# Patient Record
Sex: MALE | Race: WHITE | NOT HISPANIC OR LATINO | Employment: FULL TIME | URBAN - NONMETROPOLITAN AREA
[De-identification: names, ages, dates, MRNs, and addresses within clinical notes are randomized per-mention and may not be internally consistent; named-entity substitution may affect disease eponyms.]

---

## 2018-08-24 ENCOUNTER — HOSPITAL ENCOUNTER (OUTPATIENT)
Dept: GENERAL RADIOLOGY | Facility: HOSPITAL | Age: 28
Discharge: HOME | End: 2018-08-24
Admitting: NURSE PRACTITIONER

## 2018-08-24 ENCOUNTER — TRANSCRIBE ORDERS (OUTPATIENT)
Dept: ADMINISTRATIVE | Facility: HOSPITAL | Age: 28
End: 2018-08-24

## 2018-08-24 DIAGNOSIS — R10.32 LEFT LOWER QUADRANT PAIN: ICD-10-CM

## 2018-08-24 DIAGNOSIS — R10.32 LEFT LOWER QUADRANT PAIN: Primary | ICD-10-CM

## 2018-08-24 PROCEDURE — 74022 RADEX COMPL AQT ABD SERIES: CPT

## 2020-05-18 ENCOUNTER — OFFICE VISIT (OUTPATIENT)
Dept: INTERNAL MEDICINE | Facility: CLINIC | Age: 30
End: 2020-05-18

## 2020-05-18 VITALS
BODY MASS INDEX: 25.27 KG/M2 | DIASTOLIC BLOOD PRESSURE: 70 MMHG | TEMPERATURE: 98.8 F | WEIGHT: 203.2 LBS | HEIGHT: 75 IN | SYSTOLIC BLOOD PRESSURE: 110 MMHG | HEART RATE: 65 BPM | OXYGEN SATURATION: 99 %

## 2020-05-18 DIAGNOSIS — M25.561 ACUTE PAIN OF RIGHT KNEE: Primary | ICD-10-CM

## 2020-05-18 PROCEDURE — 99213 OFFICE O/P EST LOW 20 MIN: CPT | Performed by: NURSE PRACTITIONER

## 2020-05-18 NOTE — PROGRESS NOTES
"Subjective   Navjot Au is a 29 y.o. male.   Chief Complaint   Patient presents with   • Knee Pain     right knee cap \"crunches and unable to bear weight. Popped this morning       Navjot presents with complaints of right knee pain that started about 2-3 weeks ago and has progressively worsened.  He denies any recent injury but does report in January 2020 he slipped on a dock and hit his right knee on the corner of a rail support.  He iced the knee and rested for a few days and this resolved.  He has recently changed jobs about 1 month ago and now working at [employer] where he stands and walks on concrete rose and performs heavy lifting throughout the day.  Knee pain and swelling worsens while at work.  He has been icing the knee at home and has tried Ibuprofen once for pain.  He reports when he got up this morning there was a loud \"pop\" in his knee.  It is difficult for him to bear weight on the knee.         The following portions of the patient's history were reviewed and updated as appropriate: allergies, current medications, past family history, past medical history, past social history, past surgical history and problem list.    Review of Systems   Constitutional: Negative for activity change, appetite change, fatigue, fever, unexpected weight gain and unexpected weight loss.   HENT: Negative for swollen glands, trouble swallowing and voice change.    Eyes: Negative for blurred vision and visual disturbance.   Respiratory: Negative for cough and shortness of breath.    Cardiovascular: Negative for chest pain, palpitations and leg swelling.   Gastrointestinal: Negative for abdominal pain, constipation, diarrhea, nausea, vomiting and indigestion.   Endocrine: Negative for cold intolerance, heat intolerance, polydipsia and polyphagia.   Genitourinary: Negative for dysuria and frequency.   Musculoskeletal: Negative for arthralgias, back pain, joint swelling and neck pain.        Right knee pain   Skin: " Negative for color change, rash and skin lesions.   Neurological: Negative for dizziness, weakness, headache, memory problem and confusion.   Hematological: Does not bruise/bleed easily.   Psychiatric/Behavioral: Negative for agitation, hallucinations and suicidal ideas. The patient is not nervous/anxious.        Objective   History reviewed. No pertinent past medical history.   History reviewed. No pertinent surgical history.   No current outpatient medications on file.     Vitals:    05/18/20 0830   BP: 110/70   Pulse: 65   Temp: 98.8 °F (37.1 °C)   SpO2: 99%         05/18/20  0830   Weight: 92.2 kg (203 lb 3.2 oz)     Patient's Body mass index is 25.4 kg/m². BMI is within normal parameters. No follow-up required..      Physical Exam   Constitutional: He is oriented to person, place, and time. He appears well-developed and well-nourished.   HENT:   Head: Normocephalic and atraumatic.   Right Ear: External ear normal.   Left Ear: External ear normal.   Nose: Nose normal.   Cardiovascular: Normal rate, regular rhythm, normal heart sounds and intact distal pulses.   Pulmonary/Chest: Effort normal and breath sounds normal.   Musculoskeletal:        Right knee: He exhibits decreased range of motion and swelling (Slightly larger than left kne). He exhibits no ecchymosis and no erythema.   The right knee is tender to touch on the front lower patella, there is worsening pain with palpation to the posterior knee down to the calf.  There is no significant effusion to the posterior knee.  No edema to the calf.  Limited range of motion.  He is unable to fully extend the knee.  Flexes without difficulty.  While standing he demonstrates pain with an initial limp, then able to walk without limping.    Neurological: He is alert and oriented to person, place, and time.   Skin: Skin is warm and dry.   Psychiatric: He has a normal mood and affect. His behavior is normal. Thought content normal.   Nursing note and vitals  reviewed.            Assessment/Plan   Diagnoses and all orders for this visit:    1. Acute pain of right knee (Primary)  -     Cancel: XR Knee 1 or 2 View Right; Future  -     XR Knee 1 or 2 View Right; Future        Will obtained x-rays today.  I have informed patient that additional imaging may be needed and possible referral to ortho.  He understands and plans to get x-rays this morning following appointment.   I have informed him that he can follow up with Dr. Holliday for possible knee injection if x-rays show arthritic changes.  He declines stating he is not a fan of needles.   Navjot is overdue for yearly exam and labwork.  I have requesting he scheduled for this within the next 3 months.

## 2020-05-19 ENCOUNTER — TELEPHONE (OUTPATIENT)
Dept: INTERNAL MEDICINE | Facility: CLINIC | Age: 30
End: 2020-05-19

## 2020-05-19 DIAGNOSIS — M25.561 ACUTE PAIN OF RIGHT KNEE: Primary | ICD-10-CM

## 2020-05-19 NOTE — PROGRESS NOTES
X-ray of the knee is normal.  Will refer to Ortho.  I have placed this order in chart.  At this time, I advise rest, elevation, and Ice after working.  He may try a knee brace for extra support.

## 2020-05-19 NOTE — TELEPHONE ENCOUNTER
Patient called in requesting to go over his imaging with Soniya. Advised she would call when she has reviewed the images.

## 2020-08-24 ENCOUNTER — TELEPHONE (OUTPATIENT)
Dept: INTERNAL MEDICINE | Facility: CLINIC | Age: 30
End: 2020-08-24

## 2020-08-24 NOTE — TELEPHONE ENCOUNTER
Caller: Navjot Au    Relationship: Self    Best call back number: [phone]    What form or medical record are you requesting: LETTER STATING THAT PATIENT IS STABLE WITHOUT TAKING HIS ADD MEDICATION.     Who is requesting this form or medical record from you: [employer]    How would you like to receive the form or medical records (pick-up, mail, fax): FAX  If fax, what is the fax number: --  If mail, what is the address:   If pick-up, provide patient with address and location details    Timeframe paperwork needed: ASAP    Additional notes: PATIENT DIDN'T GET THE FAX NUMBER FOR [employer] BUT WOULD BE OKAY WITH PICKING UP THE LETTER IF NEED BE. PATIENT WILL NEED TO BE NOTIFIED.

## 2020-08-24 NOTE — TELEPHONE ENCOUNTER
I've seen him once for knee pain.  ADD medication is not listed in the chart.  Can someone get me some more information please?

## 2020-08-25 ENCOUNTER — OFFICE VISIT (OUTPATIENT)
Dept: INTERNAL MEDICINE | Facility: CLINIC | Age: 30
End: 2020-08-25

## 2020-08-25 VITALS
BODY MASS INDEX: 26.01 KG/M2 | SYSTOLIC BLOOD PRESSURE: 130 MMHG | HEART RATE: 78 BPM | TEMPERATURE: 98.2 F | DIASTOLIC BLOOD PRESSURE: 80 MMHG | WEIGHT: 209.2 LBS | OXYGEN SATURATION: 98 % | HEIGHT: 75 IN

## 2020-08-25 DIAGNOSIS — F98.8 ATTENTION DEFICIT DISORDER (ADD) WITHOUT HYPERACTIVITY: Primary | ICD-10-CM

## 2020-08-25 PROCEDURE — 99213 OFFICE O/P EST LOW 20 MIN: CPT | Performed by: NURSE PRACTITIONER

## 2020-08-25 NOTE — PROGRESS NOTES
Subjective   Navjot Au is a 30 y.o. male.   Chief Complaint   Patient presents with   • Office visit     discuss diagnosis of ADD       Navjot presents today to obtain a letter stating he can perform job duties without the use of ADD medication.  Navjot reports that he used to take Ritalin in elementary school and Concerta through the first few years of high school for ADD.  He states learning was difficult for him and the medication helped him through school.  He states he has not taken this medication in several years, which he believes has been since high school.  He states he does not have trouble with work related activities.  He has been working on getting employment with [employer] and during a prehealth screening he mentioned he was no longer on this medication, and he was told he needed a letter from his PCP stating he could perform job duties without the use of ADD medication.      He denies trouble with anxiety or mood.  He denies anger management issues.          The following portions of the patient's history were reviewed and updated as appropriate: allergies, current medications, past family history, past medical history, past social history, past surgical history and problem list.    Review of Systems   Constitutional: Negative for activity change, appetite change, fatigue, fever, unexpected weight gain and unexpected weight loss.   HENT: Negative for swollen glands, trouble swallowing and voice change.    Eyes: Negative for blurred vision and visual disturbance.   Respiratory: Negative for cough and shortness of breath.    Cardiovascular: Negative for chest pain, palpitations and leg swelling.   Gastrointestinal: Negative for abdominal pain, constipation, diarrhea, nausea, vomiting and indigestion.   Endocrine: Negative for cold intolerance, heat intolerance, polydipsia and polyphagia.   Genitourinary: Negative for dysuria and frequency.   Musculoskeletal: Negative for arthralgias, back  pain, joint swelling and neck pain.   Skin: Negative for color change, rash and skin lesions.   Neurological: Negative for dizziness, weakness, headache, memory problem and confusion.   Hematological: Does not bruise/bleed easily.   Psychiatric/Behavioral: Negative for agitation, hallucinations and suicidal ideas. The patient is not nervous/anxious.        Objective   History reviewed. No pertinent past medical history.   History reviewed. No pertinent surgical history.   No current outpatient medications on file.     Vitals:    08/25/20 1307   BP: 130/80   Pulse: 78   Temp: 98.2 °F (36.8 °C)   SpO2: 98%         08/25/20  1307   Weight: 94.9 kg (209 lb 3.2 oz)           Physical Exam   Constitutional: He is oriented to person, place, and time. He appears well-developed and well-nourished.   HENT:   Head: Normocephalic and atraumatic.   Right Ear: Tympanic membrane and external ear normal.   Left Ear: Tympanic membrane and external ear normal.   Nose: Nose normal.   Mouth/Throat: Oropharynx is clear and moist. No oral lesions.   Eyes: EOM are normal.   Neck: Normal range of motion.   Cardiovascular: Normal rate, regular rhythm and normal heart sounds.   Pulmonary/Chest: Effort normal and breath sounds normal.   Neurological: He is alert and oriented to person, place, and time.   Skin: Skin is warm and dry.   Psychiatric: He has a normal mood and affect. His speech is normal and behavior is normal. Thought content normal.   Nursing note and vitals reviewed.            Assessment/Plan   Diagnoses and all orders for this visit:    1. Attention deficit disorder (ADD) without hyperactivity (Primary)      I have reviewed past documentation in [facility] which does refills as recent as 5/2019.  Patient reports he never took this medication he returned it to the pharmacy.  A [facility] report also shows a fill in 5/2019 from a different provider.  A [facility] report was completed today does not show any fills in the past 1 year.   None the less, It appears Navjot has not been taking this medication and reports he is doing well.  He states the medication is more specific to learning issues, as opposed to physical labor.  I do not have concerns with him performing his job as a  in regards to ADD.  I have advised that he return to the office to discuss further if he feels he is having difficulty understanding or completed his job tasks.    Letter has been provided.

## 2021-06-07 ENCOUNTER — TELEPHONE (OUTPATIENT)
Dept: INTERNAL MEDICINE | Facility: CLINIC | Age: 31
End: 2021-06-07

## 2021-06-07 NOTE — TELEPHONE ENCOUNTER
PATIENT CALLED AND STATED THAT HE HAS A JOB APPLICATION AND HEALTH CERTIFICATE THAT NEEDS TO BE SIGNED BY THE END OF THIS WEEK. PLEASE ADVISE IF PROVIDER CAN SIGN THIS DOCUMENT.    CALLBACK: [phone]

## 2021-06-07 NOTE — TELEPHONE ENCOUNTER
Called patient, advised him we have never seen him in the office. Pt states he saw  10-15 yrs ago. I advised him he would need to be seen as new patient in order to fill out or sign anything. Also advised him no guarantee we would be able to sign off depending on what kind of form it is and what it is requesting as we do not have any history on file. He voiced understanding. States his PCP is  and can't see him until later this month.

## 2021-06-09 LAB
ALBUMIN SERPL-MCNC: 4.7 G/DL (ref 3.5–5.2)
ALP BLD-CCNC: 96 U/L (ref 40–130)
ALT SERPL-CCNC: 29 U/L (ref 5–41)
ANION GAP SERPL CALCULATED.3IONS-SCNC: 9 MMOL/L (ref 7–19)
AST SERPL-CCNC: 17 U/L (ref 5–40)
BASOPHILS ABSOLUTE: 0 K/UL (ref 0–0.2)
BASOPHILS RELATIVE PERCENT: 0.5 % (ref 0–1)
BILIRUB SERPL-MCNC: 0.4 MG/DL (ref 0.2–1.2)
BUN BLDV-MCNC: 13 MG/DL (ref 6–20)
CALCIUM SERPL-MCNC: 9.6 MG/DL (ref 8.6–10)
CHLORIDE BLD-SCNC: 105 MMOL/L (ref 98–111)
CO2: 28 MMOL/L (ref 22–29)
CREAT SERPL-MCNC: 0.9 MG/DL (ref 0.5–1.2)
EOSINOPHILS ABSOLUTE: 0.1 K/UL (ref 0–0.6)
EOSINOPHILS RELATIVE PERCENT: 1.8 % (ref 0–5)
GFR AFRICAN AMERICAN: >59
GFR NON-AFRICAN AMERICAN: >60
GLUCOSE BLD-MCNC: 88 MG/DL (ref 74–109)
HCT VFR BLD CALC: 48.2 % (ref 42–52)
HEMOGLOBIN: 16.5 G/DL (ref 14–18)
IMMATURE GRANULOCYTES #: 0 K/UL
LYMPHOCYTES ABSOLUTE: 1.7 K/UL (ref 1.1–4.5)
LYMPHOCYTES RELATIVE PERCENT: 26.7 % (ref 20–40)
MCH RBC QN AUTO: 29.7 PG (ref 27–31)
MCHC RBC AUTO-ENTMCNC: 34.2 G/DL (ref 33–37)
MCV RBC AUTO: 86.7 FL (ref 80–94)
MONOCYTES ABSOLUTE: 0.5 K/UL (ref 0–0.9)
MONOCYTES RELATIVE PERCENT: 8 % (ref 0–10)
NEUTROPHILS ABSOLUTE: 4.1 K/UL (ref 1.5–7.5)
NEUTROPHILS RELATIVE PERCENT: 62.7 % (ref 50–65)
PDW BLD-RTO: 13.1 % (ref 11.5–14.5)
PLATELET # BLD: 205 K/UL (ref 130–400)
PMV BLD AUTO: 9.8 FL (ref 9.4–12.4)
POTASSIUM SERPL-SCNC: 4.3 MMOL/L (ref 3.5–5)
RBC # BLD: 5.56 M/UL (ref 4.7–6.1)
SODIUM BLD-SCNC: 142 MMOL/L (ref 136–145)
TOTAL PROTEIN: 8.2 G/DL (ref 6.6–8.7)
WBC # BLD: 6.5 K/UL (ref 4.8–10.8)

## 2021-07-27 ENCOUNTER — TELEPHONE (OUTPATIENT)
Dept: URGENT CARE | Facility: CLINIC | Age: 31
End: 2021-07-27

## 2021-07-27 PROCEDURE — U0004 COV-19 TEST NON-CDC HGH THRU: HCPCS | Performed by: NURSE PRACTITIONER

## 2021-08-06 PROCEDURE — U0004 COV-19 TEST NON-CDC HGH THRU: HCPCS | Performed by: FAMILY MEDICINE

## 2022-04-18 ENCOUNTER — OFFICE VISIT (OUTPATIENT)
Dept: INTERNAL MEDICINE | Facility: CLINIC | Age: 32
End: 2022-04-18

## 2022-04-18 VITALS
SYSTOLIC BLOOD PRESSURE: 112 MMHG | TEMPERATURE: 97.1 F | BODY MASS INDEX: 25.39 KG/M2 | HEIGHT: 77 IN | DIASTOLIC BLOOD PRESSURE: 88 MMHG | HEART RATE: 71 BPM | OXYGEN SATURATION: 99 % | WEIGHT: 215 LBS

## 2022-04-18 DIAGNOSIS — Z00.00 ROUTINE GENERAL MEDICAL EXAMINATION AT A HEALTH CARE FACILITY: Primary | ICD-10-CM

## 2022-04-18 DIAGNOSIS — R68.82 LOW LIBIDO: ICD-10-CM

## 2022-04-18 DIAGNOSIS — Z11.59 NEED FOR HEPATITIS C SCREENING TEST: ICD-10-CM

## 2022-04-18 DIAGNOSIS — R53.83 FATIGUE, UNSPECIFIED TYPE: ICD-10-CM

## 2022-04-18 PROCEDURE — 99395 PREV VISIT EST AGE 18-39: CPT

## 2022-04-18 NOTE — PROGRESS NOTES
"        Subjective     Chief Complaint   Patient presents with   • Annual Exam     Requesting testosterone checked along w/other labs       History of Present Illness  Patient states he is here for annual physical. Denies any complaints today. Goes to see a phsychologist once a month to \"improve himself.\" Does not really struggle with depression, but does have a little anxiety. States he was taking with his psychologist and was discussing his energy level, and decrease sex drive. Would like his testosterone checked. Denies any chest pain or shortness of breath. States he does have \"long Covid.\"   [place] [employer] for a living, and flies planes on the side. Is  with a one year old child.   Patient's PMR from outside medical facility reviewed and noted.    Review of Systems   Constitutional: Positive for fatigue. Negative for activity change, chills and unexpected weight change.   HENT: Negative for congestion, ear pain, mouth sores, rhinorrhea, sore throat and trouble swallowing.    Eyes: Negative for discharge and visual disturbance.   Respiratory: Negative for cough, shortness of breath and wheezing.    Cardiovascular: Negative for chest pain and leg swelling.   Gastrointestinal: Negative for abdominal pain, constipation, diarrhea, nausea and vomiting.   Genitourinary: Negative for decreased urine volume, difficulty urinating and hematuria.   Musculoskeletal: Negative for back pain and gait problem.   Skin: Negative for color change and rash.   Allergic/Immunologic: Negative for environmental allergies and immunocompromised state.   Neurological: Negative for weakness and headaches.   Psychiatric/Behavioral: Negative for confusion, dysphoric mood and sleep disturbance. The patient is nervous/anxious.         Otherwise complete ROS reviewed and negative except as mentioned in the HPI.    Past Medical History: History reviewed. No pertinent past medical history.  Past Surgical History:History reviewed. No " pertinent surgical history.  Social History:  reports that he has quit smoking. His smoking use included cigars. His smokeless tobacco use includes snuff. He reports current alcohol use. He reports that he does not use drugs.    Family History: family history includes Hypertension in his father; No Known Problems in his mother.      Allergies:  No Known Allergies  Medications:  Prior to Admission medications    Medication Sig Start Date End Date Taking? Authorizing Provider   azithromycin (ZITHROMAX) 250 MG tablet Take 2 tablets by mouth the first day, then 1 tablet daily for more 4 days. 8/6/21 4/18/22  Nathaniel Murillo MD   Methylphenidate HCl ER 18 MG CR tablet Take 18 mg by mouth Every Morning.  4/18/22  Emergency, Nurse Echo, [facility]       MASOOD: Over the last two weeks, how often have you been bothered by the following problems?  Feeling nervous, anxious or on edge: Not at all  Not being able to stop or control worrying: Not at all  Worrying too much about different things: Not at all  Trouble Relaxing: Not at all  Being so restless that it is hard to sit still: Not at all  Becoming easily annoyed or irritable: Not at all  Feeling afraid as if something awful might happen: Not at all  MASOOD 7 Total Score: 0  If you checked any problems, how difficult have these problems made it for you to do your work, take care of things at home, or get along with other people: Not difficult at all      PHQ-9 Depression Screening  Little interest or pleasure in doing things? 0-->not at all   Feeling down, depressed, or hopeless? 0-->not at all   Trouble falling or staying asleep, or sleeping too much?     Feeling tired or having little energy?     Poor appetite or overeating?     Feeling bad about yourself - or that you are a failure or have let yourself or your family down?     Trouble concentrating on things, such as reading the newspaper or watching television?     Moving or speaking so slowly that other people could have  "noticed? Or the opposite - being so fidgety or restless that you have been moving around a lot more than usual?     Thoughts that you would be better off dead, or of hurting yourself in some way?     PHQ-9 Total Score 0   If you checked off any problems, how difficult have these problems made it for you to do your work, take care of things at home, or get along with other people?         PHQ-9 Total Score: 0     Recommended continuing psychotherapy/counseling    Objective     Vital Signs: /88 (BP Location: Left arm, Patient Position: Sitting, Cuff Size: Adult)   Pulse 71   Temp 97.1 °F (36.2 °C) (Temporal)   Ht 195.6 cm (77\")   Wt 97.5 kg (215 lb)   SpO2 99%   BMI 25.50 kg/m²   Physical Exam  Constitutional:       General: He is not in acute distress.     Appearance: Normal appearance. He is normal weight. He is not ill-appearing.   HENT:      Head: Normocephalic and atraumatic.      Right Ear: External ear normal.      Left Ear: External ear normal.      Nose: Nose normal. No congestion or rhinorrhea.      Mouth/Throat:      Mouth: Mucous membranes are moist.      Pharynx: No posterior oropharyngeal erythema.   Eyes:      General: No scleral icterus.     Extraocular Movements: Extraocular movements intact.      Conjunctiva/sclera: Conjunctivae normal.      Pupils: Pupils are equal, round, and reactive to light.   Cardiovascular:      Rate and Rhythm: Normal rate and regular rhythm.      Pulses: Normal pulses.      Heart sounds: Normal heart sounds.   Pulmonary:      Effort: Pulmonary effort is normal. No respiratory distress.      Breath sounds: Normal breath sounds. No wheezing.   Abdominal:      General: Abdomen is flat. Bowel sounds are normal. There is no distension.      Palpations: Abdomen is soft.      Tenderness: There is no abdominal tenderness. There is no right CVA tenderness or left CVA tenderness.   Musculoskeletal:         General: Normal range of motion.      Cervical back: Normal range " of motion and neck supple.      Right lower leg: No edema.      Left lower leg: No edema.   Lymphadenopathy:      Cervical: No cervical adenopathy.   Skin:     General: Skin is warm and dry.      Capillary Refill: Capillary refill takes less than 2 seconds.      Findings: No erythema or rash.   Neurological:      General: No focal deficit present.      Mental Status: He is alert and oriented to person, place, and time. Mental status is at baseline.      Motor: No weakness.   Psychiatric:         Mood and Affect: Mood normal.         Behavior: Behavior normal.         Thought Content: Thought content normal.         Judgment: Judgment normal.         Patient's Body mass index is 25.5 kg/m². indicating that he is within normal range (BMI 18.5-24.9). No BMI management plan needed..        Results Reviewed:  Glucose   Date Value Ref Range Status   06/09/2021 88 74 - 109 mg/dL Final     BUN   Date Value Ref Range Status   06/09/2021 13 6 - 20 mg/dL Final     Creatinine   Date Value Ref Range Status   06/09/2021 0.9 0.5 - 1.2 mg/dL Final     Sodium   Date Value Ref Range Status   06/09/2021 142 136 - 145 mmol/L Final     Potassium   Date Value Ref Range Status   06/09/2021 4.3 3.5 - 5.0 mmol/L Final     Chloride   Date Value Ref Range Status   06/09/2021 105 98 - 111 mmol/L Final     CO2   Date Value Ref Range Status   06/09/2021 28 22 - 29 mmol/L Final     Calcium   Date Value Ref Range Status   06/09/2021 9.6 8.6 - 10.0 mg/dL Final     ALT (SGPT)   Date Value Ref Range Status   06/09/2021 29 5 - 41 U/L Final     AST (SGOT)   Date Value Ref Range Status   06/09/2021 17 5 - 40 U/L Final     WBC   Date Value Ref Range Status   06/09/2021 6.5 4.8 - 10.8 K/uL Final     Hematocrit   Date Value Ref Range Status   06/09/2021 48.2 42.0 - 52.0 % Final     Platelets   Date Value Ref Range Status   06/09/2021 205 130 - 400 K/uL Final         Assessment / Plan     Assessment/Plan:  1. Routine general medical examination at a [facility]  care facility  - Comprehensive Metabolic Panel  - Lipid Panel  - CBC & Differential  - Uric Acid  - TSH  - Urinalysis With Microscopic If Indicated (No Culture) - Urine, Clean Catch    2. Need for hepatitis C screening test  - Hepatitis C Antibody    3. Fatigue, unspecified type  - Testosterone    4. Low libido  - Testosterone    Advised patient to increase exercise 30 minutes 4-5 times week. Advised to have god balanced diet.   Return in about 6 months (around 10/18/2022) for fatigue f/u. unless patient needs to be seen sooner or acute issues arise.      I have discussed the patient results/orders and and plan/recommendation with them at today's visit.      Kaitlin Franks, APRN   04/18/2022

## 2022-04-19 LAB
ALBUMIN SERPL-MCNC: 4.8 G/DL (ref 3.5–5.2)
ALBUMIN/GLOB SERPL: 1.5 G/DL
ALP SERPL-CCNC: 90 U/L (ref 39–117)
ALT SERPL-CCNC: 24 U/L (ref 1–41)
APPEARANCE UR: ABNORMAL
AST SERPL-CCNC: 14 U/L (ref 1–40)
BASOPHILS # BLD AUTO: 0.05 10*3/MM3 (ref 0–0.2)
BASOPHILS NFR BLD AUTO: 0.6 % (ref 0–1.5)
BILIRUB SERPL-MCNC: 0.4 MG/DL (ref 0–1.2)
BILIRUB UR QL STRIP: NEGATIVE
BUN SERPL-MCNC: 11 MG/DL (ref 6–20)
BUN/CREAT SERPL: 12.8 (ref 7–25)
CALCIUM SERPL-MCNC: 10 MG/DL (ref 8.6–10.5)
CHLORIDE SERPL-SCNC: 105 MMOL/L (ref 98–107)
CHOLEST SERPL-MCNC: 148 MG/DL (ref 0–200)
CO2 SERPL-SCNC: 25.2 MMOL/L (ref 22–29)
COLOR UR: YELLOW
CREAT SERPL-MCNC: 0.86 MG/DL (ref 0.76–1.27)
EGFRCR SERPLBLD CKD-EPI 2021: 119.5 ML/MIN/1.73
EOSINOPHIL # BLD AUTO: 0.23 10*3/MM3 (ref 0–0.4)
EOSINOPHIL NFR BLD AUTO: 2.9 % (ref 0.3–6.2)
ERYTHROCYTE [DISTWIDTH] IN BLOOD BY AUTOMATED COUNT: 12.7 % (ref 12.3–15.4)
GLOBULIN SER CALC-MCNC: 3.3 GM/DL
GLUCOSE SERPL-MCNC: 91 MG/DL (ref 65–99)
GLUCOSE UR QL STRIP: NEGATIVE
HCT VFR BLD AUTO: 49.9 % (ref 37.5–51)
HCV AB S/CO SERPL IA: <0.1 S/CO RATIO (ref 0–0.9)
HDLC SERPL-MCNC: 40 MG/DL (ref 40–60)
HGB BLD-MCNC: 16.5 G/DL (ref 13–17.7)
HGB UR QL STRIP: NEGATIVE
IMM GRANULOCYTES # BLD AUTO: 0.02 10*3/MM3 (ref 0–0.05)
IMM GRANULOCYTES NFR BLD AUTO: 0.3 % (ref 0–0.5)
KETONES UR QL STRIP: NEGATIVE
LDLC SERPL CALC-MCNC: 77 MG/DL (ref 0–100)
LEUKOCYTE ESTERASE UR QL STRIP: NEGATIVE
LYMPHOCYTES # BLD AUTO: 3.23 10*3/MM3 (ref 0.7–3.1)
LYMPHOCYTES NFR BLD AUTO: 41 % (ref 19.6–45.3)
MCH RBC QN AUTO: 29.3 PG (ref 26.6–33)
MCHC RBC AUTO-ENTMCNC: 33.1 G/DL (ref 31.5–35.7)
MCV RBC AUTO: 88.6 FL (ref 79–97)
MONOCYTES # BLD AUTO: 0.59 10*3/MM3 (ref 0.1–0.9)
MONOCYTES NFR BLD AUTO: 7.5 % (ref 5–12)
NEUTROPHILS # BLD AUTO: 3.76 10*3/MM3 (ref 1.7–7)
NEUTROPHILS NFR BLD AUTO: 47.7 % (ref 42.7–76)
NITRITE UR QL STRIP: NEGATIVE
NRBC BLD AUTO-RTO: 0 /100 WBC (ref 0–0.2)
PH UR STRIP: 7 [PH] (ref 5–8)
PLATELET # BLD AUTO: 235 10*3/MM3 (ref 140–450)
POTASSIUM SERPL-SCNC: 4.4 MMOL/L (ref 3.5–5.2)
PROT SERPL-MCNC: 8.1 G/DL (ref 6–8.5)
PROT UR QL STRIP: ABNORMAL
RBC # BLD AUTO: 5.63 10*6/MM3 (ref 4.14–5.8)
SODIUM SERPL-SCNC: 141 MMOL/L (ref 136–145)
SP GR UR STRIP: 1.03 (ref 1–1.03)
TESTOST SERPL-MCNC: 372 NG/DL (ref 264–916)
TRIGL SERPL-MCNC: 186 MG/DL (ref 0–150)
TSH SERPL DL<=0.005 MIU/L-ACNC: 0.4 UIU/ML (ref 0.27–4.2)
URATE SERPL-MCNC: 5.8 MG/DL (ref 3.4–7)
UROBILINOGEN UR STRIP-MCNC: ABNORMAL MG/DL
VLDLC SERPL CALC-MCNC: 31 MG/DL (ref 5–40)
WBC # BLD AUTO: 7.88 10*3/MM3 (ref 3.4–10.8)

## 2022-04-19 NOTE — PROGRESS NOTES
Please let patient know his labs look ok. Testosterone is on the lower end of normal, but still within normal range. Advised to increase higher focus on exercising. I know we had talked about pt having no drive to exercise but this could be really beneficial in helping with increased mood and testosterone increase natural. Triglycerides are slightly elevated advise to take omega 3 fish oil daily. But remainder of lipid panel is ok.

## 2022-10-05 PROCEDURE — 87081 CULTURE SCREEN ONLY: CPT | Performed by: NURSE PRACTITIONER

## 2022-10-05 PROCEDURE — 87637 SARSCOV2&INF A&B&RSV AMP PRB: CPT | Performed by: NURSE PRACTITIONER

## 2022-10-11 ENCOUNTER — OFFICE VISIT (OUTPATIENT)
Dept: INTERNAL MEDICINE | Facility: CLINIC | Age: 32
End: 2022-10-11

## 2022-10-11 VITALS
TEMPERATURE: 97.5 F | DIASTOLIC BLOOD PRESSURE: 70 MMHG | WEIGHT: 214.2 LBS | OXYGEN SATURATION: 97 % | HEART RATE: 92 BPM | BODY MASS INDEX: 25.29 KG/M2 | SYSTOLIC BLOOD PRESSURE: 130 MMHG | HEIGHT: 77 IN

## 2022-10-11 DIAGNOSIS — R68.82 LOW LIBIDO: Primary | ICD-10-CM

## 2022-10-11 DIAGNOSIS — R53.83 FATIGUE, UNSPECIFIED TYPE: ICD-10-CM

## 2022-10-11 PROCEDURE — 99213 OFFICE O/P EST LOW 20 MIN: CPT

## 2022-10-11 NOTE — PROGRESS NOTES
"Subjective   Navjot Au is a 32 y.o. male.   Chief Complaint   Patient presents with   • Fatigue     States this has been about the same, also states sex drive is \"dead\" and it really makes his wife mad   • Rash     States he had RSV last week and was given steroid pack/steroid shot and he broke out in hives, feels itchy all over, mainly on thighs/stomach/arms, took benadryl but this did not help       History of Present Illness   Mr. Au presents today for 6 month follow up on fatigue.   He was encouraged to increase exercise to 30 minutes 4-5 times/week at his last visit   Was seen in urgent care on October 5 for upper respiratory tract infection, prescribed Bromfed, inhaler, and steroid Dosepak.  Return to urgent care yesterday, October 10 with complaints of hives, was given a steroid injection and instructed to take Benadryl at night for itching.  Reports that he had allergic reaction to the steroid pack that he was given at his initial visit.  Reports that the rash is very slightly improved, still present generalized all over his body.  Reports it is itchy at night, has been taking Benadryl for this.  Denies this happening before except for when he got the COVID-vaccine.  States that he will never get another COVID-vaccine again.  States that symptoms of RSV have improved  He reports that he has been doing well besides continuing to have low libido and decreased energy, states that this began about 2 years ago, states that his testosterone levels before were borderline low, states that he took the provider's advice and start working out, states he currently works out 5 days a week however he has not noted any improvements.  He reports that him and his wife want to have a second child and he is in able to maintain an erection or climax, states that this has been very frustrating for him.  He denies any significant events or injuries prior to these symptoms starting.    The following portions of the " "patient's history were reviewed and updated as appropriate: allergies, current medications, past family history, past medical history, past social history, past surgical history and problem list.    Review of Systems    Objective   Past Medical History:   Diagnosis Date   • Testosterone deficiency in male       History reviewed. No pertinent surgical history.   No current outpatient medications on file.      /70 (BP Location: Left arm, Patient Position: Sitting, Cuff Size: Adult)   Pulse 92   Temp 97.5 °F (36.4 °C) (Temporal)   Ht 195.6 cm (77\")   Wt 97.2 kg (214 lb 3.2 oz)   SpO2 97%   BMI 25.40 kg/m²      Body mass index is 25.4 kg/m².         Physical Exam  Vitals and nursing note reviewed.   Constitutional:       Appearance: Normal appearance.   HENT:      Head: Normocephalic and atraumatic.   Eyes:      Pupils: Pupils are equal, round, and reactive to light.   Cardiovascular:      Rate and Rhythm: Normal rate and regular rhythm.      Pulses: Normal pulses.      Heart sounds: Normal heart sounds.   Pulmonary:      Effort: Pulmonary effort is normal.      Breath sounds: Normal breath sounds.   Abdominal:      General: Bowel sounds are normal.      Palpations: Abdomen is soft.   Musculoskeletal:         General: Normal range of motion.      Cervical back: Normal range of motion and neck supple.   Skin:     General: Skin is warm and dry.      Capillary Refill: Capillary refill takes less than 2 seconds.   Neurological:      General: No focal deficit present.      Mental Status: He is alert and oriented to person, place, and time. Mental status is at baseline.   Psychiatric:         Mood and Affect: Mood normal.         Behavior: Behavior normal.         Thought Content: Thought content normal.         Judgment: Judgment normal.               Assessment & Plan   Diagnoses and all orders for this visit:    1. Low libido (Primary)  -     Testosterone (Free & Total), LC / MS  -     CBC & Differential  -     " Compliance Drug Analysis, Ur - Urine, Clean Catch    2. Fatigue, unspecified type  -     Testosterone (Free & Total), LC / MS  -     CBC & Differential  -     Compliance Drug Analysis, Ur - Urine, Clean Catch               Plan of care reviewed with Mr. Au  We will recheck testosterone levels now that he has implemented the lifestyle modifications that were encouraged.  We did discuss testosterone replacement therapy in case levels are low and therapy is initiated, he is compliant with getting a UDS done today.  We will also obtain a CBC.  We discussed the risks potentially of testosterone therapy.  He is understanding of this.  I did advise that if his levels are not low will refer to urology, he states understanding.

## 2022-10-13 ENCOUNTER — TELEPHONE (OUTPATIENT)
Dept: INTERNAL MEDICINE | Facility: CLINIC | Age: 32
End: 2022-10-13

## 2022-10-13 DIAGNOSIS — L29.9 PRURITUS: ICD-10-CM

## 2022-10-13 DIAGNOSIS — L50.9 HIVES: Primary | ICD-10-CM

## 2022-10-13 RX ORDER — HYDROXYZINE HYDROCHLORIDE 25 MG/1
25 TABLET, FILM COATED ORAL 3 TIMES DAILY PRN
Qty: 21 TABLET | Refills: 0 | Status: SHIPPED | OUTPATIENT
Start: 2022-10-13 | End: 2022-10-27

## 2022-10-13 RX ORDER — CETIRIZINE HYDROCHLORIDE 10 MG/1
10 TABLET ORAL DAILY
Qty: 14 TABLET | Refills: 0 | Status: SHIPPED | OUTPATIENT
Start: 2022-10-13 | End: 2022-10-27

## 2022-10-13 NOTE — TELEPHONE ENCOUNTER
Marisela has sent in Atarax to be used tid prn itching and he is to take Zyrtec qam - stop Benadryl.  If not better by Monday, he is to call is and if he were to feel throat/tongue/mouth swelling, he is to go to the ER immediately.  Called pt and discussed/explained and he voiced understanding.  Appt for tomorrow cancelled at his request, to give time for these meds to take effect.

## 2022-10-13 NOTE — TELEPHONE ENCOUNTER
Caller: Navjot Au    Relationship: Self    Best call back number: [phone]    What is the best time to reach you: ANYTIME    Who are you requesting to speak with (clinical staff, provider,  specific staff member): JANET RUIZ OR OTHER CLINICAL STAFF    What was the call regarding: PATIENT STATES THAT BENADRYL IS NOT WORKING FOR HIVES THAT HE WAS SEEN FOR ON 10.11.2022.    Do you require a callback: YES

## 2022-10-15 LAB
BASOPHILS # BLD AUTO: 0.1 X10E3/UL (ref 0–0.2)
BASOPHILS NFR BLD AUTO: 1 %
EOSINOPHIL # BLD AUTO: 0.3 X10E3/UL (ref 0–0.4)
EOSINOPHIL NFR BLD AUTO: 3 %
ERYTHROCYTE [DISTWIDTH] IN BLOOD BY AUTOMATED COUNT: 12.5 % (ref 11.6–15.4)
HCT VFR BLD AUTO: 47.7 % (ref 37.5–51)
HGB BLD-MCNC: 16.2 G/DL (ref 13–17.7)
IMM GRANULOCYTES # BLD AUTO: 0 X10E3/UL (ref 0–0.1)
IMM GRANULOCYTES NFR BLD AUTO: 0 %
LYMPHOCYTES # BLD AUTO: 3.3 X10E3/UL (ref 0.7–3.1)
LYMPHOCYTES NFR BLD AUTO: 32 %
MCH RBC QN AUTO: 29.9 PG (ref 26.6–33)
MCHC RBC AUTO-ENTMCNC: 34 G/DL (ref 31.5–35.7)
MCV RBC AUTO: 88 FL (ref 79–97)
MONOCYTES # BLD AUTO: 0.6 X10E3/UL (ref 0.1–0.9)
MONOCYTES NFR BLD AUTO: 6 %
NEUTROPHILS # BLD AUTO: 6 X10E3/UL (ref 1.4–7)
NEUTROPHILS NFR BLD AUTO: 58 %
PLATELET # BLD AUTO: 270 X10E3/UL (ref 150–450)
RBC # BLD AUTO: 5.42 X10E6/UL (ref 4.14–5.8)
TESTOST FREE SERPL-MCNC: 4.7 PG/ML (ref 8.7–25.1)
TESTOST SERPL-MCNC: 222.6 NG/DL (ref 264–916)
WBC # BLD AUTO: 10.3 X10E3/UL (ref 3.4–10.8)

## 2022-10-17 DIAGNOSIS — R68.82 LOW LIBIDO: Primary | ICD-10-CM

## 2022-10-17 DIAGNOSIS — E29.1 HYPOGONADISM IN MALE: ICD-10-CM

## 2022-10-17 RX ORDER — TESTOSTERONE ENANTHATE 200 MG/ML
150 INJECTION, SOLUTION INTRAMUSCULAR
Qty: 6 ML | Refills: 0 | Status: CANCELLED | OUTPATIENT
Start: 2022-10-17 | End: 2023-01-15

## 2022-10-17 NOTE — PROGRESS NOTES
Total and free testosterone low, symptomatic, please see office visit note, we discussed the potential of starting therapy, CBC is unremarkable. We did UDS at visit, not resulted as of yet. Please pend testosterone enanthate 150 mg IM every 2 weeks. Will re-assess at his visit in January, before this as needed. Thank you.

## 2022-10-18 LAB — DRUGS UR: NORMAL

## 2022-10-19 ENCOUNTER — TELEPHONE (OUTPATIENT)
Dept: INTERNAL MEDICINE | Facility: CLINIC | Age: 32
End: 2022-10-19

## 2022-10-19 NOTE — TELEPHONE ENCOUNTER
Caller: Navjot Au    Relationship: Self    Best call back number: [phone]    What medication are you requesting: testosterone     If a prescription is needed, what is your preferred pharmacy and phone number: [facility]/PHARMACY #[id number] - [place], [place] - [address] - [phone]  - [phone] FX     Additional notes: pt is asking for an update, the was expecting this to be sent to the pharmacy yesterday.

## 2022-10-19 NOTE — TELEPHONE ENCOUNTER
Marisela has ordered a form of testosterone and dose of testosterone that I am not familiar with, so do not feel comfortable pending the Rx to you, would rather APRN enter the order.  The notes on his lab are for Testosterone enanthate 150mg IM q2 weeks.

## 2022-10-20 ENCOUNTER — TELEPHONE (OUTPATIENT)
Dept: INTERNAL MEDICINE | Facility: CLINIC | Age: 32
End: 2022-10-20

## 2022-10-20 DIAGNOSIS — R68.82 LOW LIBIDO: Primary | ICD-10-CM

## 2022-10-20 DIAGNOSIS — E29.1 HYPOGONADISM IN MALE: ICD-10-CM

## 2022-10-20 DIAGNOSIS — Z12.5 SCREENING PSA (PROSTATE SPECIFIC ANTIGEN): ICD-10-CM

## 2022-10-20 NOTE — TELEPHONE ENCOUNTER
PATIENT CALLED BACK IN REQUESTING WE SEND THE TESTOSTERONE PRESCRIPTION TO       PATIENT STATES HE SPOKE WITH PHARMACY AND WOULD LIKE TO JUST USE A GOOD RX CARD WITH PHARMACY INSTEAD OF USING HIS INSURANCE OR DEALING WITH THE APPEAL       GOOD CALL BACK   [phone]

## 2022-10-20 NOTE — TELEPHONE ENCOUNTER
Caller: Navjot Au    Relationship: Self    Best call back number: [phone]    What is the best time to reach you: ANYTIME    Who are you requesting to speak with (clinical staff, provider,  specific staff member): CLINICAL     What was the call regarding: CALLING TO INFORM THE PROVIDER THAT INSURANCE DENIED THE TESTOSTERONE WANTING TO SEE WHAT CAN BE DONE TO GO AHEAD WITH THIS MEDICATION     Do you require a callback: YES

## 2022-10-20 NOTE — TELEPHONE ENCOUNTER
Discussed with Maria Guadalupe, she will be ok to send this in for him, however I did order some additonal lab work to assess for any other underlying causes of the mentioned issues. We do not mind treating but just want to make sure we are doing a thorough assessment and not masking a bigger issue, please complete labs in the next week. Thank you.

## 2022-10-21 NOTE — TELEPHONE ENCOUNTER
Spoke with pt let him know of other labs that need to be completed  pt will come in today 10/21/2022

## 2022-10-24 DIAGNOSIS — E29.1 HYPOGONADISM IN MALE: Primary | ICD-10-CM

## 2022-10-24 NOTE — TELEPHONE ENCOUNTER
Caller: Navjot Au    Relationship: Self    Best call back number: [phone]    What is the best time to reach you: SOON PLEASE     Who are you requesting to speak with (clinical staff, provider,  specific staff member): CLINICAL STAFF      What was the call regarding: PATIENT REQUESTING A CALL BACK TO DISCUSS THE MEANING OF THE MYCHART MESSAGE  WILL PRESCRIPTION BE SENT TO PHARMACY     Do you require a callback:  YES

## 2022-10-25 ENCOUNTER — TELEPHONE (OUTPATIENT)
Dept: INTERNAL MEDICINE | Facility: CLINIC | Age: 32
End: 2022-10-25

## 2022-10-25 DIAGNOSIS — E29.1 HYPOGONADISM IN MALE: Primary | ICD-10-CM

## 2022-10-25 RX ORDER — TESTOSTERONE CYPIONATE 200 MG/ML
150 VIAL (ML) INTRAMUSCULAR
Qty: 1.5 ML | Refills: 0 | Status: SHIPPED | OUTPATIENT
Start: 2022-10-25

## 2022-10-25 NOTE — TELEPHONE ENCOUNTER
"    Caller: Navjot Au    Relationship to patient: Self    Best call back number: [phone]    Patient is needing:   PATIENT CALLED AND ADVISED THAT HE WENT TO  Rx FROM PHARMACY AND WAS ADVISED THAT IT WAS SENT IN INCORRECTLY. PATIENT WAS ADVISED THAT \"PACK\" Rx CAME IN CAN NOT BE BROKEN APART OR .     PLEASE CONTACT PHARMACY AND PATIENT TO ADVISE.     PHARMACY:     [facility]/pharmacy #[id number] - [place], [place] - [address] - [phone]  - [phone]   [phone]    "

## 2022-10-25 NOTE — TELEPHONE ENCOUNTER
Spoke with pharmacy, medication was not called in incorrectly but did need dose changed due to how medication is available.   Correct information given to Maria Guadalupe and she will resend

## 2022-10-27 DIAGNOSIS — E29.1 HYPOGONADISM IN MALE: Primary | ICD-10-CM

## 2022-10-27 DIAGNOSIS — R68.82 LOW LIBIDO: ICD-10-CM

## 2022-10-27 SDOH — HEALTH STABILITY: PHYSICAL HEALTH: ON AVERAGE, HOW MANY DAYS PER WEEK DO YOU ENGAGE IN MODERATE TO STRENUOUS EXERCISE (LIKE A BRISK WALK)?: 5 DAYS

## 2022-10-27 SDOH — HEALTH STABILITY: PHYSICAL HEALTH: ON AVERAGE, HOW MANY MINUTES DO YOU ENGAGE IN EXERCISE AT THIS LEVEL?: 60 MIN

## 2022-10-27 ASSESSMENT — SOCIAL DETERMINANTS OF HEALTH (SDOH)
WITHIN THE LAST YEAR, HAVE TO BEEN RAPED OR FORCED TO HAVE ANY KIND OF SEXUAL ACTIVITY BY YOUR PARTNER OR EX-PARTNER?: NO
WITHIN THE LAST YEAR, HAVE YOU BEEN HUMILIATED OR EMOTIONALLY ABUSED IN OTHER WAYS BY YOUR PARTNER OR EX-PARTNER?: NO
WITHIN THE LAST YEAR, HAVE YOU BEEN KICKED, HIT, SLAPPED, OR OTHERWISE PHYSICALLY HURT BY YOUR PARTNER OR EX-PARTNER?: NO
WITHIN THE LAST YEAR, HAVE YOU BEEN AFRAID OF YOUR PARTNER OR EX-PARTNER?: NO

## 2022-10-28 ENCOUNTER — OFFICE VISIT (OUTPATIENT)
Dept: PRIMARY CARE CLINIC | Age: 32
End: 2022-10-28
Payer: MEDICAID

## 2022-10-28 VITALS
HEART RATE: 72 BPM | HEIGHT: 77 IN | BODY MASS INDEX: 25.22 KG/M2 | DIASTOLIC BLOOD PRESSURE: 80 MMHG | SYSTOLIC BLOOD PRESSURE: 122 MMHG | TEMPERATURE: 97.6 F | WEIGHT: 213.6 LBS | RESPIRATION RATE: 16 BRPM | OXYGEN SATURATION: 7 %

## 2022-10-28 DIAGNOSIS — E29.1 HYPOGONADISM IN MALE: Primary | ICD-10-CM

## 2022-10-28 PROBLEM — R79.89 LOW TESTOSTERONE IN MALE: Status: ACTIVE | Noted: 2022-10-28

## 2022-10-28 PROCEDURE — 99213 OFFICE O/P EST LOW 20 MIN: CPT | Performed by: FAMILY MEDICINE

## 2022-10-28 RX ORDER — HYDROXYZINE HYDROCHLORIDE 25 MG/1
25 TABLET, FILM COATED ORAL 3 TIMES DAILY PRN
COMMUNITY
Start: 2022-10-13 | End: 2022-10-28

## 2022-10-28 RX ORDER — TESTOSTERONE CYPIONATE 200 MG/ML
VIAL (ML) INTRAMUSCULAR
COMMUNITY
End: 2022-11-22

## 2022-10-28 RX ORDER — CETIRIZINE HYDROCHLORIDE 10 MG/1
10 TABLET ORAL DAILY
COMMUNITY
Start: 2022-10-13 | End: 2022-11-22

## 2022-10-28 SDOH — ECONOMIC STABILITY: FOOD INSECURITY: WITHIN THE PAST 12 MONTHS, THE FOOD YOU BOUGHT JUST DIDN'T LAST AND YOU DIDN'T HAVE MONEY TO GET MORE.: SOMETIMES TRUE

## 2022-10-28 SDOH — ECONOMIC STABILITY: FOOD INSECURITY: WITHIN THE PAST 12 MONTHS, YOU WORRIED THAT YOUR FOOD WOULD RUN OUT BEFORE YOU GOT MONEY TO BUY MORE.: SOMETIMES TRUE

## 2022-10-28 ASSESSMENT — PATIENT HEALTH QUESTIONNAIRE - PHQ9
SUM OF ALL RESPONSES TO PHQ9 QUESTIONS 1 & 2: 0
1. LITTLE INTEREST OR PLEASURE IN DOING THINGS: 0
SUM OF ALL RESPONSES TO PHQ QUESTIONS 1-9: 0
2. FEELING DOWN, DEPRESSED OR HOPELESS: 0
SUM OF ALL RESPONSES TO PHQ QUESTIONS 1-9: 0

## 2022-10-28 ASSESSMENT — ENCOUNTER SYMPTOMS
WHEEZING: 0
ABDOMINAL PAIN: 0
SHORTNESS OF BREATH: 0
BLOOD IN STOOL: 0
CHEST TIGHTNESS: 0

## 2022-10-28 ASSESSMENT — SOCIAL DETERMINANTS OF HEALTH (SDOH): HOW HARD IS IT FOR YOU TO PAY FOR THE VERY BASICS LIKE FOOD, HOUSING, MEDICAL CARE, AND HEATING?: SOMEWHAT HARD

## 2022-10-28 NOTE — PROGRESS NOTES
Thomas Inman (:  1990) is a 33 y.o. male,New patient, here for evaluation of the following chief complaint(s):  New Patient (Was seeing Dr Yoana Rebolledo (he retired) then the next doctor and has had many issues, needs to talk about getting his testosterone shots. Had recent labs at [facility]. Had RSV about a month ago)         ASSESSMENT/PLAN:  1. Hypogonadism in male    Previous labs were reviewed and were notable for low testosterone however given that it was tested later in the day this result cannot be considered accurate. Previous labs are notable for normal FSH, LH, PSA, and prolactin. we will repeat testosterone and free testosterone testing in addition to CBC and CMP. Patient instructed he will need to have these labs drawn between the hours of 8 and 10 AM. will refer patient to urology at this time given he has hypogonadism at a young age, patient prefers to see Dr. Fredi Carey. Return in about 8 weeks (around 2022). Subjective   SUBJECTIVE/OBJECTIVE:  Thomas Inman is a 33 y.o. male who presents to establish care. Patient says he would like to change PCPs because he did not feel like he got along well with the last PCP. Patient says he has been undergoing testing due to low energy and low testosterone. Patient says that this has been going on for several months and he complains of low energy, low libido, poorly sustained erection, and mild breast enlargement. Patient has had 1 child and states that he would like to have another in the near future. Patient denies any visual disturbances. Patient says he has had his testosterone checked 2 times as well as several other hormones but notes that his testosterone was checked later in the day and not in the morning. Patient is interested in possibly seeing urology  No other concerns at this time          Review of Systems   Constitutional:  Positive for fatigue. Negative for activity change and fever. HENT:  Negative for congestion. Eyes:  Negative for visual disturbance. Respiratory:  Negative for chest tightness, shortness of breath and wheezing. Cardiovascular:  Negative for chest pain. Gastrointestinal:  Negative for abdominal pain and blood in stool. Genitourinary:  Negative for difficulty urinating and urgency. Neurological:  Negative for weakness and headaches. Psychiatric/Behavioral:  Negative for confusion. Objective   Physical Exam  Vitals reviewed. Constitutional:       General: He is not in acute distress. Appearance: Normal appearance. He is not ill-appearing. HENT:      Head: Normocephalic and atraumatic. Cardiovascular:      Rate and Rhythm: Normal rate and regular rhythm. Pulses: Normal pulses. Heart sounds: Normal heart sounds. No murmur heard. Pulmonary:      Effort: Pulmonary effort is normal. No respiratory distress. Breath sounds: Normal breath sounds. No wheezing or rhonchi. Chest:      Chest wall: No tenderness. Abdominal:      General: Abdomen is flat. Bowel sounds are normal. There is no distension. Palpations: Abdomen is soft. Tenderness: There is no abdominal tenderness. Musculoskeletal:         General: No swelling. Skin:     General: Skin is warm and dry. Comments: Patient has normal male hair distribution, no obvious gynecomastia   Neurological:      General: No focal deficit present. Mental Status: He is alert.           Vitals:    10/28/22 1457   BP: 122/80   Pulse: 72   Resp: 16   Temp: 97.6 °F (36.4 °C)   SpO2: (!) 7%        Current Outpatient Medications   Medication Sig Dispense Refill    Testosterone Cypionate 200 MG/ML SOLN  (Patient not taking: Reported on 10/28/2022)      cetirizine (ZYRTEC) 10 MG tablet Take 10 mg by mouth daily (Patient not taking: Reported on 10/28/2022)      hydrOXYzine HCl (ATARAX) 25 MG tablet Take 25 mg by mouth 3 times daily as needed (Patient not taking: Reported on 10/28/2022)       No current facility-administered medications for this visit. No family history on file. No past medical history on file. No past surgical history on file. No Known Allergies     Lab Results   Component Value Date     06/09/2021    K 4.3 06/09/2021     06/09/2021    CO2 28 06/09/2021    BUN 13 06/09/2021    CREATININE 0.9 06/09/2021    GLUCOSE 88 06/09/2021    CALCIUM 9.6 06/09/2021    PROT 8.2 06/09/2021    LABALBU 4.7 06/09/2021    BILITOT 0.4 06/09/2021    ALKPHOS 96 06/09/2021    AST 17 06/09/2021    ALT 29 06/09/2021    LABGLOM >60 06/09/2021    GFRAA >59 06/09/2021    GLOB 3.5 12/31/2015        Lab Results   Component Value Date    WBC 6.5 06/09/2021    HGB 16.5 06/09/2021    HCT 48.2 06/09/2021    MCV 86.7 06/09/2021     06/09/2021                EMR Dragon/transcription disclaimer:  Much of this encounter note is electronic transcription/translation of spoken language toprinted texts. The electronic translation of spoken language may be erroneous, or at times, nonsensical words or phrases may be inadvertently transcribed. Although I have reviewed the note for such errors, some may stillexist.      An electronic signature was used to authenticate this note.     --Amish Woods MD

## 2022-10-31 DIAGNOSIS — E29.1 HYPOGONADISM IN MALE: ICD-10-CM

## 2022-10-31 LAB
ALBUMIN SERPL-MCNC: 4.4 G/DL (ref 3.5–5.2)
ALP BLD-CCNC: 87 U/L (ref 40–130)
ALT SERPL-CCNC: 15 U/L (ref 5–41)
ANION GAP SERPL CALCULATED.3IONS-SCNC: 11 MMOL/L (ref 7–19)
AST SERPL-CCNC: 12 U/L (ref 5–40)
BASOPHILS ABSOLUTE: 0 K/UL (ref 0–0.2)
BASOPHILS RELATIVE PERCENT: 0.5 % (ref 0–1)
BILIRUB SERPL-MCNC: 0.5 MG/DL (ref 0.2–1.2)
BUN BLDV-MCNC: 17 MG/DL (ref 6–20)
CALCIUM SERPL-MCNC: 9.4 MG/DL (ref 8.6–10)
CHLORIDE BLD-SCNC: 104 MMOL/L (ref 98–111)
CO2: 26 MMOL/L (ref 22–29)
CREAT SERPL-MCNC: 0.9 MG/DL (ref 0.5–1.2)
EOSINOPHILS ABSOLUTE: 0.3 K/UL (ref 0–0.6)
EOSINOPHILS RELATIVE PERCENT: 3.2 % (ref 0–5)
GFR SERPL CREATININE-BSD FRML MDRD: >60 ML/MIN/{1.73_M2}
GLUCOSE BLD-MCNC: 88 MG/DL (ref 74–109)
HCT VFR BLD CALC: 45.4 % (ref 42–52)
HEMOGLOBIN: 16 G/DL (ref 14–18)
IMMATURE GRANULOCYTES #: 0 K/UL
LYMPHOCYTES ABSOLUTE: 2.4 K/UL (ref 1.1–4.5)
LYMPHOCYTES RELATIVE PERCENT: 30.5 % (ref 20–40)
MCH RBC QN AUTO: 30.7 PG (ref 27–31)
MCHC RBC AUTO-ENTMCNC: 35.2 G/DL (ref 33–37)
MCV RBC AUTO: 87.1 FL (ref 80–94)
MONOCYTES ABSOLUTE: 0.6 K/UL (ref 0–0.9)
MONOCYTES RELATIVE PERCENT: 7.4 % (ref 0–10)
NEUTROPHILS ABSOLUTE: 4.6 K/UL (ref 1.5–7.5)
NEUTROPHILS RELATIVE PERCENT: 58.1 % (ref 50–65)
PDW BLD-RTO: 13.6 % (ref 11.5–14.5)
PLATELET # BLD: 190 K/UL (ref 130–400)
PMV BLD AUTO: 10.7 FL (ref 9.4–12.4)
POTASSIUM SERPL-SCNC: 4 MMOL/L (ref 3.5–5)
RBC # BLD: 5.21 M/UL (ref 4.7–6.1)
SODIUM BLD-SCNC: 141 MMOL/L (ref 136–145)
TOTAL PROTEIN: 7.6 G/DL (ref 6.6–8.7)
WBC # BLD: 7.9 K/UL (ref 4.8–10.8)

## 2022-11-04 LAB
SEX HORMONE BINDING GLOBULIN: 27 NMOL/L (ref 11–80)
TESTOSTERONE FREE-NONMALE: 95.4 PG/ML (ref 47–244)
TESTOSTERONE TOTAL: 417 NG/DL (ref 220–1000)

## 2022-11-22 ENCOUNTER — OFFICE VISIT (OUTPATIENT)
Dept: PRIMARY CARE CLINIC | Age: 32
End: 2022-11-22
Payer: MEDICAID

## 2022-11-22 VITALS
SYSTOLIC BLOOD PRESSURE: 124 MMHG | DIASTOLIC BLOOD PRESSURE: 72 MMHG | BODY MASS INDEX: 25.03 KG/M2 | HEART RATE: 79 BPM | HEIGHT: 77 IN | TEMPERATURE: 97.7 F | WEIGHT: 212 LBS | OXYGEN SATURATION: 97 %

## 2022-11-22 DIAGNOSIS — R79.89 LOW TESTOSTERONE IN MALE: ICD-10-CM

## 2022-11-22 DIAGNOSIS — L23.9 ALLERGIC CONTACT DERMATITIS, UNSPECIFIED TRIGGER: Primary | ICD-10-CM

## 2022-11-22 PROCEDURE — 99213 OFFICE O/P EST LOW 20 MIN: CPT | Performed by: FAMILY MEDICINE

## 2022-11-22 RX ORDER — CLOBETASOL PROPIONATE 0.5 MG/G
OINTMENT TOPICAL
Qty: 30 G | Refills: 0 | Status: SHIPPED | OUTPATIENT
Start: 2022-11-22

## 2022-11-22 ASSESSMENT — ENCOUNTER SYMPTOMS
CHEST TIGHTNESS: 0
BLOOD IN STOOL: 0
ABDOMINAL PAIN: 0
SHORTNESS OF BREATH: 0
WHEEZING: 0

## 2022-11-22 NOTE — PROGRESS NOTES
Gabriela Ayala (:  1990) is a 33 y.o. male,Established patient, here for evaluation of the following chief complaint(s):  Skin Problem (Possible allergic reaction. He has a rash and some oozing on right hand, he states he also has a spot on right leg as well. He states he has had this happen before but was all over his body. )         ASSESSMENT/PLAN:  1. Allergic contact dermatitis, unspecified trigger  -     clobetasol (TEMOVATE) 0.05 % ointment; Apply topically 2 times daily. , Disp-30 g, R-0, Normal  -     External Referral To Allergy  2. Low testosterone in male    Rash on hand and leg consistent with likely contact dermatitis. Discussed potentially treating with p.o. prednisone however patient said he did not tolerate this well in the past thus we will treat with topical clobetasol applied twice daily for 1 to 2 weeks. Patient was also agreeable to allergy referral at this time to better elucidate causative agent given that this has been recurrent. Discussed prior labs with patient including normal testosterone result. Patient is scheduled for follow-up of this in middle of December and we will look at doing further testing to isolate because of persistent fatigue    Return if symptoms worsen or fail to improve. Subjective   SUBJECTIVE/OBJECTIVE:  Gabriela Ayala is a 33 y.o. male who presents due to R rash on the hand and inner thigh. Patient said he went hunting earlier last week and then shortly afterward on Wednesday the rash broke out. Patient has had known known irritant contacts. She says he has been taking OTC Claritin without much relief. Patient did try applying some over-the-counter hydrocortisone which she says feels like irritated it further. Other details as listed below        Skin Problem  This is a new problem. The problem has been gradually worsening since onset. The affected locations include the right hand and right upper leg.  The rash is characterized by blistering, draining, itchiness and redness. He was exposed to nothing. Pertinent negatives include no congestion, fatigue, fever or shortness of breath. Past treatments include nothing. The treatment provided no relief. Review of Systems   Constitutional:  Negative for activity change, fatigue and fever. HENT:  Negative for congestion, ear discharge and ear pain. Eyes:  Negative for visual disturbance. Respiratory:  Negative for chest tightness, shortness of breath and wheezing. Cardiovascular:  Negative for chest pain. Gastrointestinal:  Negative for abdominal pain and blood in stool. Genitourinary:  Negative for difficulty urinating, dysuria and urgency. Neurological:  Negative for weakness and headaches. Psychiatric/Behavioral:  Negative for confusion. Objective   Physical Exam  Constitutional:       Appearance: Normal appearance. HENT:      Head: Normocephalic and atraumatic. Skin:     General: Skin is warm and dry. Findings: Erythema and rash present. Comments: Thin strip of irritant contact dermatitis on posterior right hand approximately 5 cm x 1 cm. Blistered with active drainage serosanguineous in nature. Inner right thigh with 3-4 areas of erythema no active drainage from the site   Neurological:      Mental Status: He is alert. Vitals:    11/22/22 1602   BP: 124/72   Pulse: 79   Temp: 97.7 °F (36.5 °C)   SpO2: 97%        Current Outpatient Medications   Medication Sig Dispense Refill    Loratadine (CLARITIN PO) Take by mouth      clobetasol (TEMOVATE) 0.05 % ointment Apply topically 2 times daily. 30 g 0     No current facility-administered medications for this visit. No family history on file. History reviewed. No pertinent past medical history. No past surgical history on file.    No Known Allergies     Lab Results   Component Value Date     10/31/2022    K 4.0 10/31/2022     10/31/2022    CO2 26 10/31/2022    BUN 17 10/31/2022 CREATININE 0.9 10/31/2022    GLUCOSE 88 10/31/2022    CALCIUM 9.4 10/31/2022    PROT 7.6 10/31/2022    LABALBU 4.4 10/31/2022    BILITOT 0.5 10/31/2022    ALKPHOS 87 10/31/2022    AST 12 10/31/2022    ALT 15 10/31/2022    LABGLOM >60 10/31/2022    GFRAA >59 06/09/2021    GLOB 3.5 12/31/2015        Lab Results   Component Value Date    WBC 7.9 10/31/2022    HGB 16.0 10/31/2022    HCT 45.4 10/31/2022    MCV 87.1 10/31/2022     10/31/2022                EMR Dragon/transcription disclaimer:  Much of this encounter note is electronic transcription/translation of spoken language toprinted texts. The electronic translation of spoken language may be erroneous, or at times, nonsensical words or phrases may be inadvertently transcribed. Although I have reviewed the note for such errors, some may stillexist.      An electronic signature was used to authenticate this note.     --Archana Ortiz MD

## 2022-12-15 ENCOUNTER — OFFICE VISIT (OUTPATIENT)
Dept: UROLOGY | Age: 32
End: 2022-12-15
Payer: MEDICAID

## 2022-12-15 VITALS — TEMPERATURE: 97.9 F | WEIGHT: 215 LBS | BODY MASS INDEX: 25.39 KG/M2 | HEIGHT: 77 IN

## 2022-12-15 DIAGNOSIS — E29.1 HYPOGONADISM MALE: Primary | ICD-10-CM

## 2022-12-15 LAB
APPEARANCE FLUID: CLEAR
BILIRUBIN, POC: NORMAL
BLOOD URINE, POC: NORMAL
CLARITY, POC: CLEAR
COLOR, POC: YELLOW
GLUCOSE URINE, POC: NORMAL
KETONES, POC: NORMAL
LEUKOCYTE EST, POC: NORMAL
NITRITE, POC: NORMAL
PH, POC: 6
PROTEIN, POC: NORMAL
SPECIFIC GRAVITY, POC: 1.03
UROBILINOGEN, POC: 0.2

## 2022-12-15 PROCEDURE — 81002 URINALYSIS NONAUTO W/O SCOPE: CPT | Performed by: UROLOGY

## 2022-12-15 PROCEDURE — 99243 OFF/OP CNSLTJ NEW/EST LOW 30: CPT | Performed by: UROLOGY

## 2022-12-15 ASSESSMENT — ENCOUNTER SYMPTOMS
ABDOMINAL PAIN: 0
NAUSEA: 0
EYE DISCHARGE: 0
DIARRHEA: 0
TROUBLE SWALLOWING: 0
COUGH: 0
ABDOMINAL DISTENTION: 0
CONSTIPATION: 0
BACK PAIN: 0
VOMITING: 0
EYE REDNESS: 0
SHORTNESS OF BREATH: 0

## 2022-12-15 NOTE — PROGRESS NOTES
Ivan Myles is a 33 y.o. male who presents today   Chief Complaint   Patient presents with    New Patient     I am a new patient being seen for hypogonadism. Having very low energy, low libido, weight gain, and no focus. Never been on any type of testosterone      Hypogonadism:  Patient is here today for symptoms of low testosterone which started  month(s) ago. The patient's last testosterone level was:  Lab Results   Component Value Date    TESTOSTERONE 417 10/31/2022   Testosterone done on 4/18/2022 was 372 mg/dL normal  Testosterone done on 10/11/2022 was 222.6 ng/dL slightly below normal.  Free testosterone was 4.7 pg/mL with normal being between 8.7 and 25.1  Recently his hypogonadism symptoms: show no change  Current medical Rx for hypogonadism: none  His erections are present. He may have had some muscle rigidity  Sex drive/libido: decreased  Energy level:  decreased   He also complains of some weight gain despite working out the gym  He also complains of not being able to have thoughtful focus    Past Medical History:   Diagnosis Date    Hives     gets hives since getting COVID 19 shot       Past Surgical History:   Procedure Laterality Date    WISDOM TOOTH EXTRACTION Bilateral        Current Outpatient Medications   Medication Sig Dispense Refill    clobetasol (TEMOVATE) 0.05 % ointment Apply topically 2 times daily. 30 g 0     No current facility-administered medications for this visit.        No Known Allergies    Social History     Socioeconomic History    Marital status:      Spouse name: None    Number of children: None    Years of education: None    Highest education level: None   Tobacco Use    Smoking status: Former     Types: Cigars     Passive exposure: Never    Smokeless tobacco: Never   Vaping Use    Vaping Use: Never used   Substance and Sexual Activity    Alcohol use: Not Currently    Drug use: No    Sexual activity: Not Currently     Partners: Female     Comment: Low sex drive, low testosterone levels found     Social Determinants of Health     Financial Resource Strain: Medium Risk    Difficulty of Paying Living Expenses: Somewhat hard   Food Insecurity: Food Insecurity Present    Worried About Running Out of Food in the Last Year: Sometimes true    Ran Out of Food in the Last Year: Sometimes true   Physical Activity: Sufficiently Active    Days of Exercise per Week: 5 days    Minutes of Exercise per Session: 60 min   Intimate Partner Violence: Not At Risk    Fear of Current or Ex-Partner: No    Emotionally Abused: No    Physically Abused: No    Sexually Abused: No       No family history on file. REVIEW OF SYSTEMS:  Review of Systems   Unable to perform ROS: Other   Constitutional:  Negative for chills, fatigue and fever. HENT:  Negative for congestion, ear pain and trouble swallowing. Eyes:  Negative for discharge and redness. Respiratory:  Negative for cough and shortness of breath. Cardiovascular:  Negative for chest pain. Gastrointestinal:  Negative for abdominal distention, abdominal pain, constipation, diarrhea, nausea and vomiting. Endocrine: Negative for cold intolerance and heat intolerance. Genitourinary:  Negative for difficulty urinating, dysuria, flank pain, frequency, hematuria and urgency. Musculoskeletal:  Negative for back pain and gait problem. Skin:  Negative for pallor and wound. Allergic/Immunologic: Negative for environmental allergies and immunocompromised state. Neurological:  Negative for dizziness and weakness. Hematological:  Negative for adenopathy. Does not bruise/bleed easily. Psychiatric/Behavioral:  Negative for agitation and confusion. PHYSICAL EXAM:  Temp 97.9 °F (36.6 °C) (Temporal)   Ht 6' 5\" (1.956 m)   Wt 215 lb (97.5 kg)   BMI 25.50 kg/m²   Physical Exam  Constitutional:       General: He is not in acute distress. Appearance: Normal appearance. He is well-developed.    HENT:      Head: Normocephalic and atraumatic. Nose: Nose normal.   Eyes:      General: No scleral icterus. Conjunctiva/sclera: Conjunctivae normal.      Pupils: Pupils are equal, round, and reactive to light. Neck:      Trachea: No tracheal deviation. Cardiovascular:      Rate and Rhythm: Normal rate and regular rhythm. Pulses: Normal pulses. Pulmonary:      Effort: Pulmonary effort is normal. No respiratory distress. Breath sounds: No stridor. Abdominal:      General: There is no distension. Palpations: Abdomen is soft. There is no mass. Tenderness: There is no abdominal tenderness. Genitourinary:     Penis: Normal and circumcised. Testes: Normal.         Right: Mass or tenderness not present. Left: Mass or tenderness not present. Epididymis:      Right: Normal.      Left: Normal.      Comments: Right testis is slightly less firm and smaller than left patient states size been like that  Musculoskeletal:         General: No tenderness or deformity. Normal range of motion. Cervical back: Normal range of motion and neck supple. Lymphadenopathy:      Cervical: No cervical adenopathy. Skin:     General: Skin is warm and dry. Findings: No erythema. Neurological:      General: No focal deficit present. Mental Status: He is alert and oriented to person, place, and time.    Psychiatric:         Behavior: Behavior normal.         Judgment: Judgment normal.           DATA:  CMP:    Lab Results   Component Value Date/Time     10/31/2022 09:33 AM    K 4.0 10/31/2022 09:33 AM     10/31/2022 09:33 AM    CO2 26 10/31/2022 09:33 AM    BUN 17 10/31/2022 09:33 AM    CREATININE 0.9 10/31/2022 09:33 AM    GFRAA >59 06/09/2021 10:42 AM    LABGLOM >60 10/31/2022 09:33 AM    GLUCOSE 88 10/31/2022 09:33 AM    PROT 7.6 10/31/2022 09:33 AM    LABALBU 4.4 10/31/2022 09:33 AM    CALCIUM 9.4 10/31/2022 09:33 AM    BILITOT 0.5 10/31/2022 09:33 AM    ALKPHOS 87 10/31/2022 09:33 AM    AST 12 10/31/2022 09:33 AM    ALT 15 10/31/2022 09:33 AM     Results for orders placed or performed in visit on 12/15/22   POCT Urinalysis no Micro   Result Value Ref Range    Color, UA yellow     Clarity, UA clear     Glucose, UA POC neg     Bilirubin, UA neg     Ketones, UA neg     Spec Grav, UA 1.030     Blood, UA POC neg     pH, UA 6.0     Protein, UA POC neg     Urobilinogen, UA 0.2     Leukocytes, UA neg     Nitrite, UA neg     Appearance, Fluid Clear Clear, Slightly Cloudy     Labs done on 10/21/2022 at outside hospital were reviewed and summarized here. PSA was normal 1.5  Prolactin normal 11.0  LH normal 5.4  FSH normal 6.4  Testosterone was 222.6 free testosterone was 4.7. Testosterone prior to that on 4/18/2020 was normal at 372  AM testosterone done on 10/31/2022 was normal 417 normal sex binding hormone and his free testosterone was 95.4 normal    1. Hypogonadism male  He is only had 1 isolated afternoon testosterone that was slightly below normal his other testosterones and labs are within normal limits therefore I cannot account his symptoms of fatigue loss of concentration and decreased libido to just low testosterone and do not feel the risks over benefits of TRT justify placing him on TRT for normal testosterone numbers. We talked about good lifestyle choices. He will discuss further with his PCP.  - POCT Urinalysis no Micro      Orders Placed This Encounter   Procedures    POCT Urinalysis no Micro        Return if symptoms worsen or fail to improve. All information inputted into the note by the MA to include chief complaint, past medical history, past surgical history, medications, allergies, social and family history and review of systems has been reviewed and updated as needed by me. EMR Dragon/transcription disclaimer: Much of this documentt is electronic  transcription/translation of spoken language to printed text.  The  electronic translation of spoken language may be erroneous, or at times,  nonsensical words or phrases may be inadvertently transcribed.  Although I  have reviewed the document for such errors, some may still exist.

## 2023-01-23 ENCOUNTER — OFFICE VISIT (OUTPATIENT)
Dept: PRIMARY CARE CLINIC | Age: 33
End: 2023-01-23
Payer: COMMERCIAL

## 2023-01-23 VITALS
HEART RATE: 92 BPM | WEIGHT: 211.4 LBS | HEIGHT: 77 IN | TEMPERATURE: 97.5 F | SYSTOLIC BLOOD PRESSURE: 126 MMHG | BODY MASS INDEX: 24.96 KG/M2 | OXYGEN SATURATION: 97 % | DIASTOLIC BLOOD PRESSURE: 80 MMHG

## 2023-01-23 DIAGNOSIS — J02.9 SORE THROAT: ICD-10-CM

## 2023-01-23 DIAGNOSIS — J06.9 VIRAL URI: Primary | ICD-10-CM

## 2023-01-23 LAB
S PYO AG THROAT QL: NORMAL
SARS-COV-2, PCR: NOT DETECTED

## 2023-01-23 PROCEDURE — 87880 STREP A ASSAY W/OPTIC: CPT | Performed by: FAMILY MEDICINE

## 2023-01-23 PROCEDURE — 99213 OFFICE O/P EST LOW 20 MIN: CPT | Performed by: FAMILY MEDICINE

## 2023-01-23 RX ORDER — BENZONATATE 200 MG/1
200 CAPSULE ORAL 3 TIMES DAILY PRN
Qty: 30 CAPSULE | Refills: 0 | Status: SHIPPED | OUTPATIENT
Start: 2023-01-23 | End: 2023-01-30

## 2023-01-23 ASSESSMENT — PATIENT HEALTH QUESTIONNAIRE - PHQ9
SUM OF ALL RESPONSES TO PHQ QUESTIONS 1-9: 0
SUM OF ALL RESPONSES TO PHQ9 QUESTIONS 1 & 2: 0
2. FEELING DOWN, DEPRESSED OR HOPELESS: 0
1. LITTLE INTEREST OR PLEASURE IN DOING THINGS: 0
SUM OF ALL RESPONSES TO PHQ QUESTIONS 1-9: 0

## 2023-01-23 ASSESSMENT — ENCOUNTER SYMPTOMS
CHEST TIGHTNESS: 0
ABDOMINAL PAIN: 0
WHEEZING: 0
SORE THROAT: 1
COUGH: 0
SHORTNESS OF BREATH: 0
BLOOD IN STOOL: 0

## 2023-01-23 NOTE — PROGRESS NOTES
Selene Bailey (:  1990) is a 33 y.o. male,Established patient, here for evaluation of the following chief complaint(s):  Congestion and Pharyngitis         ASSESSMENT/PLAN:  1. Viral URI  2. Sore throat  -     POCT rapid strep A  -     COVID-19    Strep swab completed in office and negative at this time COVID swab pending. Counseled patient that his symptoms are likely secondary to ongoing viral upper respiratory infection. Patient encouraged to manage with OTC medications. Benzonatate and benzocaine throat lozenges sent to pharmacy for symptomatic management. Encouraged hydration. Recommended patient take at least another 2 to 3 days off of work to help mitigate spread. Many illnesses are caused by viruses. These conditions usually run their course in 7-14 days. Antibiotics do not help fight viral infections and are not needed at this time. Viral syndromes are treated with symptomatic support. You may take tylenol or ibuprofen for fever or aches and pains. Stay hydrated by taking sips of water or non caffeinated, noncarbonated, and nonalcoholic beverages throughout the day. For sore throat, you may gargle with warm salt water, use lozenges or sprays. Using a daily antihistamine such as Claritin, Zyrtec or Allegra can help with upper respiratory symptoms. Benadryl can be sedating but is helpful at drying secretions and may be taken at night. Call if you have a fever greater than 102 F or if symptoms do not improve. Your provider may also send you in prescription medications depending on your symptoms and their severity. Take all medications as directed on package unless specifically told otherwise. Return if symptoms worsen or fail to improve. Subjective   SUBJECTIVE/OBJECTIVE:   Selene Bailey is a 33 y.o. male who presents due to sore throat and upper respiratory symptoms since last Thursday.   Patient notes that his mother was sick with similar symptoms for the week prior and believes that he caught it from her. Patient says aside from sore throat he has been fatigued and did experience some chills when trying to sleep last night. Patient has not noticed any fever. Patient has been taking TheraFlu and OTC cough drops for symptomatic relief. Details as listed below        Pharyngitis  This is a new problem. The current episode started in the past 7 days. The problem occurs constantly. The problem has been gradually worsening. Associated symptoms include chills, congestion, fatigue and a sore throat. Pertinent negatives include no abdominal pain, chest pain, coughing, fever, headaches or weakness. Review of Systems   Constitutional:  Positive for chills and fatigue. Negative for activity change and fever. HENT:  Positive for congestion and sore throat. Eyes:  Negative for visual disturbance. Respiratory:  Negative for cough, chest tightness, shortness of breath and wheezing. Cardiovascular:  Negative for chest pain. Gastrointestinal:  Negative for abdominal pain and blood in stool. Genitourinary:  Negative for difficulty urinating and urgency. Neurological:  Negative for weakness and headaches. Psychiatric/Behavioral:  Negative for confusion. Objective   Physical Exam  Vitals reviewed. Constitutional:       General: He is not in acute distress. Appearance: Normal appearance. He is not ill-appearing. HENT:      Head: Normocephalic and atraumatic. Right Ear: Ear canal and external ear normal. A middle ear effusion is present. Left Ear: Ear canal and external ear normal. A middle ear effusion is present. Nose: Congestion and rhinorrhea present. Mouth/Throat:      Mouth: Mucous membranes are moist.      Pharynx: Posterior oropharyngeal erythema present. No oropharyngeal exudate. Tonsils: No tonsillar exudate. Cardiovascular:      Rate and Rhythm: Normal rate and regular rhythm. Pulses: Normal pulses.       Heart sounds: Normal heart sounds. No murmur heard. Pulmonary:      Effort: Pulmonary effort is normal. No respiratory distress. Breath sounds: Normal breath sounds. No wheezing or rhonchi. Chest:      Chest wall: No tenderness. Abdominal:      General: Abdomen is flat. Bowel sounds are normal. There is no distension. Palpations: Abdomen is soft. Tenderness: There is no abdominal tenderness. Musculoskeletal:         General: No swelling. Skin:     General: Skin is warm and dry. Neurological:      General: No focal deficit present. Mental Status: He is alert. Vitals:    01/23/23 0810   BP: 126/80   Pulse: 92   Temp: 97.5 °F (36.4 °C)   SpO2: 97%        Current Outpatient Medications   Medication Sig Dispense Refill    benzonatate (TESSALON) 200 MG capsule Take 1 capsule by mouth 3 times daily as needed for Cough 30 capsule 0    Benzocaine 15 MG LOZG Take 1 lozenge by mouth every 3-4 hours as needed (sore throat) 25 lozenge 0     No current facility-administered medications for this visit. No family history on file.    Past Medical History:   Diagnosis Date    Hives     gets hives since getting COVID 19 shot      Past Surgical History:   Procedure Laterality Date    WISDOM TOOTH EXTRACTION Bilateral       No Known Allergies     Lab Results   Component Value Date     10/31/2022    K 4.0 10/31/2022     10/31/2022    CO2 26 10/31/2022    BUN 17 10/31/2022    CREATININE 0.9 10/31/2022    GLUCOSE 88 10/31/2022    CALCIUM 9.4 10/31/2022    PROT 7.6 10/31/2022    LABALBU 4.4 10/31/2022    BILITOT 0.5 10/31/2022    ALKPHOS 87 10/31/2022    AST 12 10/31/2022    ALT 15 10/31/2022    LABGLOM >60 10/31/2022    GFRAA >59 06/09/2021    GLOB 3.5 12/31/2015        Lab Results   Component Value Date    WBC 7.9 10/31/2022    HGB 16.0 10/31/2022    HCT 45.4 10/31/2022    MCV 87.1 10/31/2022     10/31/2022                EMR Dragon/transcription disclaimer:  Much of this encounter note is electronic transcription/translation of spoken language toprinted texts. The electronic translation of spoken language may be erroneous, or at times, nonsensical words or phrases may be inadvertently transcribed. Although I have reviewed the note for such errors, some may stillexist.      An electronic signature was used to authenticate this note.     --Sydnee Lovett MD

## 2023-02-01 ENCOUNTER — OFFICE VISIT (OUTPATIENT)
Dept: PRIMARY CARE CLINIC | Age: 33
End: 2023-02-01
Payer: COMMERCIAL

## 2023-02-01 VITALS
BODY MASS INDEX: 24.94 KG/M2 | HEART RATE: 94 BPM | SYSTOLIC BLOOD PRESSURE: 130 MMHG | WEIGHT: 211.2 LBS | RESPIRATION RATE: 18 BRPM | OXYGEN SATURATION: 98 % | HEIGHT: 77 IN | DIASTOLIC BLOOD PRESSURE: 70 MMHG | TEMPERATURE: 97.5 F

## 2023-02-01 DIAGNOSIS — Z00.00 WELLNESS EXAMINATION: Primary | ICD-10-CM

## 2023-02-01 PROCEDURE — 99212 OFFICE O/P EST SF 10 MIN: CPT | Performed by: FAMILY MEDICINE

## 2023-02-01 ASSESSMENT — ENCOUNTER SYMPTOMS
SHORTNESS OF BREATH: 0
WHEEZING: 0
BLOOD IN STOOL: 0
ABDOMINAL PAIN: 0
CHEST TIGHTNESS: 0

## 2023-02-01 NOTE — PROGRESS NOTES
Melissa Estrada (:  1990) is a 33 y.o. male,Established patient, here for evaluation of the following chief complaint(s): Annual Exam         ASSESSMENT/PLAN:  1. Wellness examination    Documents reviewed and signed and copied into chart. Benefits of work at this time outweigh risks and patient does not present infectious hazard to any other employees at this time. Return if symptoms worsen or fail to improve. Subjective   SUBJECTIVE/OBJECTIVE:  Melissa Estrada is a 33 y.o. male who presents for work physical.  Patient states he is overall doing well and feels well at this time. Patient says that he is feeling better since his viral illness a few weeks ago though does note that sometimes his throat is a little bit dry at night. No other concerns at this time        Review of Systems   Constitutional:  Negative for activity change and fever. HENT:  Negative for congestion. Eyes:  Negative for visual disturbance. Respiratory:  Negative for chest tightness, shortness of breath and wheezing. Cardiovascular:  Negative for chest pain. Gastrointestinal:  Negative for abdominal pain and blood in stool. Genitourinary:  Negative for difficulty urinating and urgency. Neurological:  Negative for weakness and headaches. Psychiatric/Behavioral:  Negative for confusion. Objective   Physical Exam  Vitals reviewed. Constitutional:       General: He is not in acute distress. Appearance: Normal appearance. He is not ill-appearing. HENT:      Head: Normocephalic and atraumatic. Mouth/Throat:      Mouth: Mucous membranes are moist.   Cardiovascular:      Rate and Rhythm: Normal rate and regular rhythm. Pulses: Normal pulses. Heart sounds: Normal heart sounds. No murmur heard. Pulmonary:      Effort: Pulmonary effort is normal. No respiratory distress. Breath sounds: Normal breath sounds. No wheezing or rhonchi. Chest:      Chest wall: No tenderness.    Abdominal: General: Abdomen is flat. Bowel sounds are normal. There is no distension. Palpations: Abdomen is soft. Tenderness: There is no abdominal tenderness. Musculoskeletal:         General: No swelling. Skin:     General: Skin is warm and dry. Neurological:      General: No focal deficit present. Mental Status: He is alert. Vitals:    02/01/23 1413   BP: 130/70   Pulse: 94   Resp: 18   Temp: 97.5 °F (36.4 °C)   SpO2: 98%        No current outpatient medications on file. No current facility-administered medications for this visit. No family history on file. Past Medical History:   Diagnosis Date    Hives     gets hives since getting COVID 19 shot      Past Surgical History:   Procedure Laterality Date    WISDOM TOOTH EXTRACTION Bilateral       No Known Allergies     Lab Results   Component Value Date     10/31/2022    K 4.0 10/31/2022     10/31/2022    CO2 26 10/31/2022    BUN 17 10/31/2022    CREATININE 0.9 10/31/2022    GLUCOSE 88 10/31/2022    CALCIUM 9.4 10/31/2022    PROT 7.6 10/31/2022    LABALBU 4.4 10/31/2022    BILITOT 0.5 10/31/2022    ALKPHOS 87 10/31/2022    AST 12 10/31/2022    ALT 15 10/31/2022    LABGLOM >60 10/31/2022    GFRAA >59 06/09/2021    GLOB 3.5 12/31/2015        Lab Results   Component Value Date    WBC 7.9 10/31/2022    HGB 16.0 10/31/2022    HCT 45.4 10/31/2022    MCV 87.1 10/31/2022     10/31/2022                EMR Dragon/transcription disclaimer:  Much of this encounter note is electronic transcription/translation of spoken language toprinted texts. The electronic translation of spoken language may be erroneous, or at times, nonsensical words or phrases may be inadvertently transcribed. Although I have reviewed the note for such errors, some may stillexist.      An electronic signature was used to authenticate this note.     --Ted Kan MD

## 2023-08-21 ENCOUNTER — OFFICE VISIT (OUTPATIENT)
Age: 33
End: 2023-08-21

## 2023-08-21 VITALS
HEART RATE: 75 BPM | HEIGHT: 77 IN | SYSTOLIC BLOOD PRESSURE: 132 MMHG | DIASTOLIC BLOOD PRESSURE: 70 MMHG | RESPIRATION RATE: 20 BRPM | OXYGEN SATURATION: 97 % | TEMPERATURE: 97.4 F | BODY MASS INDEX: 25.86 KG/M2 | WEIGHT: 219 LBS

## 2023-08-21 DIAGNOSIS — H69.83 DYSFUNCTION OF BOTH EUSTACHIAN TUBES: Primary | ICD-10-CM

## 2023-08-21 ASSESSMENT — ENCOUNTER SYMPTOMS
CHOKING: 0
EYE PAIN: 0
EYE REDNESS: 0
DIARRHEA: 0
CHEST TIGHTNESS: 0
ABDOMINAL PAIN: 0
STRIDOR: 0
FACIAL SWELLING: 0
ABDOMINAL DISTENTION: 0
TROUBLE SWALLOWING: 0
SINUS PRESSURE: 0
COLOR CHANGE: 0
NAUSEA: 0
SORE THROAT: 0
EYE DISCHARGE: 0
VOMITING: 0
WHEEZING: 0
SINUS PAIN: 0
COUGH: 0
CONSTIPATION: 0
SHORTNESS OF BREATH: 0
APNEA: 0

## 2023-08-21 NOTE — PROGRESS NOTES
[address]  [address] [place] [address]  Dept: [phone]  Dept Fax: [phone]  Loc: [phone]    Chuckie Harrison is a 29 y.o. male who presents today for his medical conditions/complaints as noted below. Chuckie Harrison is complaining of Ear Fullness (left)        HPI:   Pt presents with c/o left sided ear fullness; denies pain, drainage, itching. Pt says he works at [employer] and he looked in the ear at work, thinks there is wax in the ear. No meds for this. S/s moderate. stable    Ear Fullness   Pertinent negatives include no abdominal pain, coughing, diarrhea, ear discharge, headaches, hearing loss, rash, sore throat or vomiting. Past Medical History:   Diagnosis Date    Hives     gets hives since getting COVID 19 shot       Past Surgical History:   Procedure Laterality Date    WISDOM TOOTH EXTRACTION Bilateral        History reviewed. No pertinent family history. Social History     Tobacco Use    Smoking status: Former     Types: Cigars     Passive exposure: Never    Smokeless tobacco: Never   Substance Use Topics    Alcohol use: Not Currently        No current outpatient medications on file. No current facility-administered medications for this visit.        No Known Allergies    Health Maintenance   Topic Date Due    COVID-19 Vaccine (1) Never done    Varicella vaccine (1 of 2 - 2-dose childhood series) Never done    HIV screen  Never done    Hepatitis C screen  Never done    DTaP/Tdap/Td vaccine (2 - Tdap) 06/26/2010    Flu vaccine (1) Never done    Depression Screen  01/23/2024    Hepatitis A vaccine  Aged Out    Hib vaccine  Aged Out    Meningococcal (ACWY) vaccine  Aged Out    Pneumococcal 0-64 years Vaccine  Aged Out    Hepatitis B vaccine  Discontinued       Subjective:   Review of Systems   Constitutional:  Negative for activity change, appetite change, chills, diaphoresis, fatigue, fever and unexpected weight

## 2023-08-21 NOTE — PATIENT INSTRUCTIONS
Recommended OTC flonase    Recommended OTC claritin or zyrtec    Increase fluid intake    The patient is to follow up with PCP or return to clinic if symptoms worsen/fail to improve.

## 2023-09-08 ENCOUNTER — OFFICE VISIT (OUTPATIENT)
Dept: PRIMARY CARE CLINIC | Age: 33
End: 2023-09-08
Payer: MEDICAID

## 2023-09-08 VITALS
HEIGHT: 77 IN | HEART RATE: 105 BPM | SYSTOLIC BLOOD PRESSURE: 124 MMHG | RESPIRATION RATE: 16 BRPM | OXYGEN SATURATION: 97 % | WEIGHT: 218 LBS | DIASTOLIC BLOOD PRESSURE: 78 MMHG | BODY MASS INDEX: 25.74 KG/M2 | TEMPERATURE: 97.6 F

## 2023-09-08 DIAGNOSIS — U07.1 COVID-19: Primary | ICD-10-CM

## 2023-09-08 PROCEDURE — 99213 OFFICE O/P EST LOW 20 MIN: CPT | Performed by: FAMILY MEDICINE

## 2023-09-08 RX ORDER — BENZONATATE 200 MG/1
200 CAPSULE ORAL 3 TIMES DAILY PRN
Qty: 30 CAPSULE | Refills: 0 | Status: SHIPPED | OUTPATIENT
Start: 2023-09-08 | End: 2023-09-15

## 2023-09-08 RX ORDER — ZINC GLUCONATE 50 MG
50 TABLET ORAL DAILY
COMMUNITY

## 2023-09-08 ASSESSMENT — ENCOUNTER SYMPTOMS
ABDOMINAL PAIN: 0
WHEEZING: 0
DIARRHEA: 0
VOMITING: 0
CHEST TIGHTNESS: 0
SHORTNESS OF BREATH: 0
NAUSEA: 0
SORE THROAT: 1
COUGH: 1
BLOOD IN STOOL: 0

## 2023-09-08 NOTE — PROGRESS NOTES
Christy Fowler (:  1990) is a 29 y.o. male,Established patient, here for evaluation of the following chief complaint(s):  Cough (Cough, congestion, headache, ear ache since the beginning of the week. Symptoms have progressed since. Had a positive home COVID test on Wednesday.)         ASSESSMENT/PLAN:  1. COVID-19  -     benzonatate (TESSALON) 200 MG capsule; Take 1 capsule by mouth 3 times daily as needed for Cough, Disp-30 capsule, R-0Normal      Given positivity of home COVID test I do not believe it is necessary repeating this with a PCR though I did offer it to patient and he declined. Patient advised that current recommendations are for 5 days of quarantine with an additional 5 days of mask wearing from onset of symptoms. I did send in some benzonatate for symptomatic management of cough in the meantime. Many illnesses are caused by viruses. These conditions usually run their course in 7-14 days. Antibiotics do not help fight viral infections and are not needed at this time. Viral syndromes are treated with symptomatic support. You may take tylenol or ibuprofen for fever or aches and pains. Stay hydrated by taking sips of water or non caffeinated, noncarbonated, and nonalcoholic beverages throughout the day. For sore throat, you may gargle with warm salt water, use lozenges or sprays. Using a daily antihistamine such as Claritin, Zyrtec or Allegra can help with upper respiratory symptoms. Benadryl can be sedating but is helpful at drying secretions and may be taken at night. Call if you have a fever greater than 102 F or if symptoms do not improve. Your provider may also send you in prescription medications depending on your symptoms and their severity. Take all medications as directed on package unless specifically told otherwise. Return if symptoms worsen or fail to improve.          Subjective   SUBJECTIVE/OBJECTIVE:  Christy Fowler is a 29 y.o. male who presents due to upper respiratory

## 2023-10-27 ENCOUNTER — OFFICE VISIT (OUTPATIENT)
Dept: PRIMARY CARE CLINIC | Age: 33
End: 2023-10-27
Payer: MEDICAID

## 2023-10-27 VITALS
HEIGHT: 77 IN | SYSTOLIC BLOOD PRESSURE: 116 MMHG | RESPIRATION RATE: 18 BRPM | HEART RATE: 98 BPM | WEIGHT: 219.2 LBS | BODY MASS INDEX: 25.88 KG/M2 | OXYGEN SATURATION: 99 % | DIASTOLIC BLOOD PRESSURE: 84 MMHG | TEMPERATURE: 97 F

## 2023-10-27 DIAGNOSIS — N45.4 TESTICULAR ABSCESS: ICD-10-CM

## 2023-10-27 DIAGNOSIS — N50.89 SWELLING OF RIGHT TESTICLE: Primary | ICD-10-CM

## 2023-10-27 LAB
C TRACH DNA UR QL NAA+PROBE: NOT DETECTED
N GONORRHOEA DNA UR QL NAA+PROBE: NOT DETECTED
T VAGINALIS DNA UR QL NAA+PROBE: NOT DETECTED

## 2023-10-27 PROCEDURE — 99213 OFFICE O/P EST LOW 20 MIN: CPT | Performed by: NURSE PRACTITIONER

## 2023-10-27 RX ORDER — CEPHALEXIN 500 MG/1
500 CAPSULE ORAL 4 TIMES DAILY
Qty: 28 CAPSULE | Refills: 0 | Status: SHIPPED | OUTPATIENT
Start: 2023-10-27 | End: 2023-11-03

## 2023-10-27 SDOH — ECONOMIC STABILITY: FOOD INSECURITY: WITHIN THE PAST 12 MONTHS, YOU WORRIED THAT YOUR FOOD WOULD RUN OUT BEFORE YOU GOT MONEY TO BUY MORE.: NEVER TRUE

## 2023-10-27 SDOH — ECONOMIC STABILITY: TRANSPORTATION INSECURITY
IN THE PAST 12 MONTHS, HAS LACK OF TRANSPORTATION KEPT YOU FROM MEETINGS, WORK, OR FROM GETTING THINGS NEEDED FOR DAILY LIVING?: NO

## 2023-10-27 SDOH — ECONOMIC STABILITY: INCOME INSECURITY: HOW HARD IS IT FOR YOU TO PAY FOR THE VERY BASICS LIKE FOOD, HOUSING, MEDICAL CARE, AND HEATING?: NOT VERY HARD

## 2023-10-27 SDOH — ECONOMIC STABILITY: FOOD INSECURITY: WITHIN THE PAST 12 MONTHS, THE FOOD YOU BOUGHT JUST DIDN'T LAST AND YOU DIDN'T HAVE MONEY TO GET MORE.: NEVER TRUE

## 2023-10-27 SDOH — ECONOMIC STABILITY: HOUSING INSECURITY
IN THE LAST 12 MONTHS, WAS THERE A TIME WHEN YOU DID NOT HAVE A STEADY PLACE TO SLEEP OR SLEPT IN A SHELTER (INCLUDING NOW)?: NO

## 2023-10-27 ASSESSMENT — ENCOUNTER SYMPTOMS
COUGH: 0
ALLERGIC/IMMUNOLOGIC NEGATIVE: 1
EYES NEGATIVE: 1
GASTROINTESTINAL NEGATIVE: 1
RESPIRATORY NEGATIVE: 1

## 2023-10-27 NOTE — PROGRESS NOTES
[facility]YS[facility]  [facility] [address] [address] [address] [address]  Dept: [phone]  Dept Fax: [phone]  Loc: [phone]    Danny Caery is a 29 y.o. male who presents today for his medical conditions/complaints as noted below. Danny Carey is c/o of Cyst (Pt is here for a possible cyst that has grown on his scrotum. Ht noticed it on Wednesday. He states it was an ingrown hair and pulled it out and has noticed that it has gotten bigger since them. He states it's on the right side. Pt denies pain unless walking. Pt has noticed some discoloration and swelling. Pt denies fever to the area or streaking. )        HPI:     HPI   This 27-year-old male presents today for a lump on his right testicle. States that it started on Wednesday as what he thought was an ingrown hair. He states he pulled the hair out but instead of getting better just continued to worsen. He does state that it only hurts when he walks or sits the wrong way and it applies pressure to that site. States that he has not had any fever or any difficulty in urination, achieving an erection or with orgasm. He denies any concern for STDs. He denies any discharge penile pain. Chief Complaint   Patient presents with    Cyst     Pt is here for a possible cyst that has grown on his scrotum. Ht noticed it on Wednesday. He states it was an ingrown hair and pulled it out and has noticed that it has gotten bigger since them. He states it's on the right side. Pt denies pain unless walking. Pt has noticed some discoloration and swelling. Pt denies fever to the area or streaking.       Past Medical History:   Diagnosis Date    Hives     gets hives since getting COVID 19 shot      Past Surgical History:   Procedure Laterality Date    WISDOM TOOTH EXTRACTION Bilateral            10/27/2023     1:21 PM 9/8/2023     1:24 PM 8/21/2023    11:00 AM 2/1/2023     2:13 PM 1/23/2023     8:10 AM 12/15/2022    10:22 AM   Vitals   SYSTOLIC 351

## 2023-11-06 ENCOUNTER — OFFICE VISIT (OUTPATIENT)
Dept: PRIMARY CARE CLINIC | Age: 33
End: 2023-11-06
Payer: MEDICAID

## 2023-11-06 VITALS
HEART RATE: 62 BPM | SYSTOLIC BLOOD PRESSURE: 118 MMHG | DIASTOLIC BLOOD PRESSURE: 74 MMHG | RESPIRATION RATE: 16 BRPM | OXYGEN SATURATION: 97 % | TEMPERATURE: 97.8 F

## 2023-11-06 DIAGNOSIS — N49.2 SCROTAL WALL ABSCESS: Primary | ICD-10-CM

## 2023-11-06 PROCEDURE — 99213 OFFICE O/P EST LOW 20 MIN: CPT | Performed by: FAMILY MEDICINE

## 2023-11-06 RX ORDER — DOXYCYCLINE HYCLATE 100 MG
100 TABLET ORAL 2 TIMES DAILY
Qty: 10 TABLET | Refills: 0 | Status: SHIPPED | OUTPATIENT
Start: 2023-11-06 | End: 2023-11-11

## 2023-11-06 NOTE — PROGRESS NOTES
Chuckie Harrison (:  1990) is a 29 y.o. male,Established patient, here for evaluation of the following chief complaint(s):  Follow-up (He took half of the prescription he was prescribed due to having severe diarrhea. Swelling has decreased dramatically.)         ASSESSMENT/PLAN:  1. Scrotal wall abscess      The abscess does appear significantly improved however given that patient did not complete the full antibiotic course I do worry that it may return if we do not extend his prescription treatment. Given the patient had such a severe reaction to the Keflex we will try and finish out a abbreviated course with doxycycline at this time. We will do 5 days of 100 mg twice daily. Patient advised to continue monitoring this and if he notices it continuing to enlarge or worsening erythema to notify us. Patient advised to take course to completion. Patient warned about potential for forniers gangrene if infection were to spread and how critical it is for this to be closely monitored for worsening    Return if symptoms worsen or fail to improve. Subjective   SUBJECTIVE/OBJECTIVE:  Chuckie Harrison is a 29 y.o. male who presents for follow-up appointment. Patient was seen 1 week ago due to a scrotal abscess. Patient says that he first noticed this occurring around 2 weeks ago and it kept getting bigger which prompted him to come in for evaluation. Patient was prescribed Keflex and completed 3 days of this however stopped it secondary to the diarrhea he was experiencing from the medication. Patient says he can still feel it today however it is significantly improved from where it was. Patient denies any fever or pain at this time. Patient denies any drainage from the mass            Review of Systems   Constitutional:  Negative for activity change and fever. HENT:  Negative for congestion. Eyes:  Negative for visual disturbance.    Respiratory:  Negative for chest tightness, shortness of breath and

## 2023-11-07 ASSESSMENT — ENCOUNTER SYMPTOMS
ABDOMINAL PAIN: 0
WHEEZING: 0
CHEST TIGHTNESS: 0
SHORTNESS OF BREATH: 0
BLOOD IN STOOL: 0

## 2024-02-13 ENCOUNTER — OFFICE VISIT (OUTPATIENT)
Age: 34
End: 2024-02-13
Payer: MEDICAID

## 2024-02-13 VITALS
DIASTOLIC BLOOD PRESSURE: 78 MMHG | OXYGEN SATURATION: 96 % | HEART RATE: 82 BPM | RESPIRATION RATE: 20 BRPM | WEIGHT: 224 LBS | HEIGHT: 77 IN | TEMPERATURE: 98.9 F | SYSTOLIC BLOOD PRESSURE: 132 MMHG | BODY MASS INDEX: 26.45 KG/M2

## 2024-02-13 DIAGNOSIS — S50.861A INSECT BITE OF RIGHT FOREARM, INITIAL ENCOUNTER: Primary | ICD-10-CM
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
## 2024-02-13 PROCEDURE — 99213 OFFICE O/P EST LOW 20 MIN: CPT | Performed by: NURSE PRACTITIONER

## 2024-02-13 RX ORDER — CEPHALEXIN 500 MG/1
500 CAPSULE ORAL 2 TIMES DAILY
Qty: 20 CAPSULE | Refills: 0 | Status: SHIPPED | OUTPATIENT
Start: 2024-02-13 | End: 2024-02-23

## 2024-02-13 NOTE — PROGRESS NOTES
is no abdominal tenderness.   Musculoskeletal:         General: No swelling or deformity. Normal range of motion.        Arms:       Cervical back: Normal range of motion. No rigidity or tenderness.      Comments: Approximate 1 inch area of firm, raised erythema on right forearm.  No streaking, wound, or drainage.    Skin:     General: Skin is warm and dry.      Findings: Erythema present. No rash.   Neurological:      General: No focal deficit present.      Mental Status: He is alert and oriented to person, place, and time.      Sensory: No sensory deficit.         /78   Pulse 82   Temp 98.9 °F (37.2 °C)   Resp 20   Ht 1.956 m (6' 5\")   Wt 101.6 kg (224 lb)   SpO2 96%   BMI 26.56 kg/m²     Assessment         Diagnosis Orders   1. Insect bite of right forearm, initial encounter  cephALEXin (KEFLEX) 500 MG capsule          Plan   Keflex sent  OTC steroid cream  Do not pick at wound  Follow-up with PCP as needed  Go to ER for worrisome symptoms      Patient verbalized understanding and agrees to plan.   No orders of the defined types were placed in this encounter.      No results found for this visit on 02/13/24.    Orders Placed This Encounter   Medications    cephALEXin (KEFLEX) 500 MG capsule     Sig: Take 1 capsule by mouth 2 times daily for 10 days     Dispense:  20 capsule     Refill:  0      New Prescriptions    CEPHALEXIN (KEFLEX) 500 MG CAPSULE    Take 1 capsule by mouth 2 times daily for 10 days        No follow-ups on file.         Discussed use, benefits, and side effects of any prescribed medications. All patient questions were answered. Patient voiced understanding of care plan.   Patient was given educational materials - see patient instructions below.     Patient Instructions   Keflex sent  OTC steroid cream  Do not pick at wound  Follow-up with PCP as needed  Go to ER for worrisome symptoms      Patient verbalized understanding and agrees to plan.       Electronically signed by Kavya

## 2024-02-13 NOTE — PATIENT INSTRUCTIONS
Keflex sent  OTC steroid cream  Do not pick at wound  Follow-up with PCP as needed  Go to ER for worrisome symptoms      Patient verbalized understanding and agrees to plan.

## 2024-03-28 ENCOUNTER — OFFICE VISIT (OUTPATIENT)
Dept: PRIMARY CARE CLINIC | Age: 34
End: 2024-03-28
Payer: MEDICAID

## 2024-03-28 VITALS
TEMPERATURE: 98.4 F | SYSTOLIC BLOOD PRESSURE: 132 MMHG | DIASTOLIC BLOOD PRESSURE: 78 MMHG | HEART RATE: 82 BPM | BODY MASS INDEX: 26.21 KG/M2 | WEIGHT: 221 LBS | OXYGEN SATURATION: 97 %

## 2024-03-28 DIAGNOSIS — S46.911A STRAIN OF RIGHT SHOULDER, INITIAL ENCOUNTER: ICD-10-CM

## 2024-03-28 DIAGNOSIS — J06.9 VIRAL URI: Primary | ICD-10-CM

## 2024-03-28 DIAGNOSIS — J98.4 RESTRICTIVE AIRWAY DISEASE: ICD-10-CM

## 2024-03-28 PROCEDURE — 99214 OFFICE O/P EST MOD 30 MIN: CPT | Performed by: NURSE PRACTITIONER

## 2024-03-28 RX ORDER — CETIRIZINE HYDROCHLORIDE 10 MG/1
10 TABLET ORAL DAILY
Qty: 90 TABLET | Refills: 3 | Status: SHIPPED | OUTPATIENT
Start: 2024-03-28

## 2024-03-28 RX ORDER — ALBUTEROL SULFATE 90 UG/1
2 AEROSOL, METERED RESPIRATORY (INHALATION) 4 TIMES DAILY PRN
Qty: 18 G | Refills: 0 | Status: SHIPPED | OUTPATIENT
Start: 2024-03-28

## 2024-03-28 RX ORDER — METHYLPREDNISOLONE 4 MG/1
TABLET ORAL
Qty: 1 KIT | Refills: 0 | Status: SHIPPED | OUTPATIENT
Start: 2024-03-28 | End: 2024-04-03

## 2024-03-28 ASSESSMENT — PATIENT HEALTH QUESTIONNAIRE - PHQ9
1. LITTLE INTEREST OR PLEASURE IN DOING THINGS: NOT AT ALL
SUM OF ALL RESPONSES TO PHQ9 QUESTIONS 1 & 2: 0
SUM OF ALL RESPONSES TO PHQ QUESTIONS 1-9: 0
SUM OF ALL RESPONSES TO PHQ QUESTIONS 1-9: 0
2. FEELING DOWN, DEPRESSED OR HOPELESS: NOT AT ALL
SUM OF ALL RESPONSES TO PHQ9 QUESTIONS 1 & 2: 0
1. LITTLE INTEREST OR PLEASURE IN DOING THINGS: NOT AT ALL
2. FEELING DOWN, DEPRESSED OR HOPELESS: NOT AT ALL
SUM OF ALL RESPONSES TO PHQ QUESTIONS 1-9: 0
SUM OF ALL RESPONSES TO PHQ QUESTIONS 1-9: 0

## 2024-03-28 ASSESSMENT — ENCOUNTER SYMPTOMS: COUGH: 1

## 2024-03-28 NOTE — PROGRESS NOTES
Review Result            Urine Drug Screenings (1 yr)    No resulted procedures found.       Medication Contract and Consent for Opioid Use Documents Filed        No documents found                     Patient given educational materials -see patient instructions.  Discussed use, benefit, and side effects of prescribed medications.  All patient questions answered.  Pt voiced understanding. Reviewed health maintenance.  Instructed to continue currentmedications, diet and exercise.  Patient agreed with treatment plan. Follow up as directed.   MEDICATIONS:  Orders Placed This Encounter   Medications    methylPREDNISolone (MEDROL DOSEPACK) 4 MG tablet     Sig: Take by mouth.     Dispense:  1 kit     Refill:  0    cetirizine (ZYRTEC) 10 MG tablet     Sig: Take 1 tablet by mouth daily     Dispense:  90 tablet     Refill:  3    albuterol sulfate HFA (VENTOLIN HFA) 108 (90 Base) MCG/ACT inhaler     Sig: Inhale 2 puffs into the lungs 4 times daily as needed for Wheezing     Dispense:  18 g     Refill:  0         ORDERS:  No orders of the defined types were placed in this encounter.      Follow-up:  Return if symptoms worsen or fail to improve.    PATIENT INSTRUCTIONS:  Patient Instructions   See exercises for shoulder if not improving 1-2 weeks see us for imaging work up  Zyrtec for allergies  Start the steroid pack for shoulder and will help the lungs  Use the inhaler for wheezing  Limited lifting with shoulder  If worsening cough call and we could try cough meds or antibiotics.   Electronically signed by EYAD Mccord CNP on 3/28/2024 at 9:54 AM    EMR Dragon/transcription disclaimer:  Much of thisencounter note is electronic transcription/translation of spoken language to printed texts.  The electronic translation of spoken language may be erroneous, or at times, nonsensical words or phrases may be inadvertentlytranscribed.  Although I have reviewed the note for such errors, some may still exist.

## 2024-03-28 NOTE — PATIENT INSTRUCTIONS
See exercises for shoulder if not improving 1-2 weeks see us for imaging work up  Zyrtec for allergies  Start the steroid pack for shoulder and will help the lungs  Use the inhaler for wheezing  Limited lifting with shoulder  If worsening cough call and we could try cough meds or antibiotics.

## 2024-09-24 ENCOUNTER — OFFICE VISIT (OUTPATIENT)
Dept: PRIMARY CARE CLINIC | Age: 34
End: 2024-09-24
Payer: COMMERCIAL

## 2024-09-24 VITALS
OXYGEN SATURATION: 98 % | WEIGHT: 207.4 LBS | RESPIRATION RATE: 18 BRPM | BODY MASS INDEX: 24.49 KG/M2 | TEMPERATURE: 97.3 F | HEIGHT: 77 IN | DIASTOLIC BLOOD PRESSURE: 70 MMHG | SYSTOLIC BLOOD PRESSURE: 120 MMHG | HEART RATE: 80 BPM

## 2024-09-24 DIAGNOSIS — L72.0 EPIDERMOID CYST: Primary | ICD-10-CM

## 2024-09-24 PROCEDURE — 10060 I&D ABSCESS SIMPLE/SINGLE: CPT | Performed by: FAMILY MEDICINE

## 2024-09-24 PROCEDURE — 99213 OFFICE O/P EST LOW 20 MIN: CPT | Performed by: FAMILY MEDICINE

## 2024-09-24 ASSESSMENT — ENCOUNTER SYMPTOMS
ABDOMINAL PAIN: 0
WHEEZING: 0
CHEST TIGHTNESS: 0
BLOOD IN STOOL: 0
SHORTNESS OF BREATH: 0

## 2025-02-03 ENCOUNTER — OFFICE VISIT (OUTPATIENT)
Dept: PRIMARY CARE CLINIC | Age: 35
End: 2025-02-03
Payer: COMMERCIAL

## 2025-02-03 VITALS
HEIGHT: 77 IN | TEMPERATURE: 100.6 F | OXYGEN SATURATION: 98 % | RESPIRATION RATE: 16 BRPM | SYSTOLIC BLOOD PRESSURE: 130 MMHG | HEART RATE: 120 BPM | BODY MASS INDEX: 25.27 KG/M2 | DIASTOLIC BLOOD PRESSURE: 80 MMHG | WEIGHT: 214 LBS

## 2025-02-03 DIAGNOSIS — R50.9 FEVER, UNSPECIFIED: ICD-10-CM

## 2025-02-03 DIAGNOSIS — J10.1 INFLUENZA A: Primary | ICD-10-CM

## 2025-02-03 LAB
INFLUENZA A ANTIBODY: POSITIVE
INFLUENZA B ANTIBODY: NEGATIVE

## 2025-02-03 PROCEDURE — 87804 INFLUENZA ASSAY W/OPTIC: CPT | Performed by: FAMILY MEDICINE

## 2025-02-03 PROCEDURE — 99213 OFFICE O/P EST LOW 20 MIN: CPT | Performed by: FAMILY MEDICINE

## 2025-02-03 RX ORDER — ONDANSETRON 4 MG/1
4 TABLET, ORALLY DISINTEGRATING ORAL 3 TIMES DAILY PRN
Qty: 21 TABLET | Refills: 0 | Status: SHIPPED | OUTPATIENT
Start: 2025-02-03

## 2025-02-03 RX ORDER — BROMPHENIRAMINE MALEATE, PSEUDOEPHEDRINE HYDROCHLORIDE, AND DEXTROMETHORPHAN HYDROBROMIDE 2; 30; 10 MG/5ML; MG/5ML; MG/5ML
5 SYRUP ORAL 4 TIMES DAILY PRN
Qty: 118 ML | Refills: 0 | Status: SHIPPED | OUTPATIENT
Start: 2025-02-03

## 2025-02-03 SDOH — ECONOMIC STABILITY: FOOD INSECURITY: WITHIN THE PAST 12 MONTHS, THE FOOD YOU BOUGHT JUST DIDN'T LAST AND YOU DIDN'T HAVE MONEY TO GET MORE.: NEVER TRUE

## 2025-02-03 SDOH — ECONOMIC STABILITY: FOOD INSECURITY: WITHIN THE PAST 12 MONTHS, YOU WORRIED THAT YOUR FOOD WOULD RUN OUT BEFORE YOU GOT MONEY TO BUY MORE.: NEVER TRUE

## 2025-02-03 ASSESSMENT — ENCOUNTER SYMPTOMS
WHEEZING: 0
CHEST TIGHTNESS: 0
SORE THROAT: 1
ABDOMINAL PAIN: 0
FLU SYMPTOMS: 1
BLOOD IN STOOL: 0
NAUSEA: 1
VOMITING: 1
SHORTNESS OF BREATH: 0
COUGH: 1

## 2025-02-03 ASSESSMENT — PATIENT HEALTH QUESTIONNAIRE - PHQ9
1. LITTLE INTEREST OR PLEASURE IN DOING THINGS: NOT AT ALL
2. FEELING DOWN, DEPRESSED OR HOPELESS: NOT AT ALL
SUM OF ALL RESPONSES TO PHQ9 QUESTIONS 1 & 2: 0
SUM OF ALL RESPONSES TO PHQ QUESTIONS 1-9: 0

## 2025-02-03 NOTE — PROGRESS NOTES
Amilcar Gracia (:  1990) is a 34 y.o. male,Established patient, here for evaluation of the following chief complaint(s):  Congestion (With productive cough since last night around midnight.  Wife and children have been sick with the flu.  )      Assessment & Plan   ASSESSMENT/PLAN:  1. Influenza A  -     ondansetron (ZOFRAN-ODT) 4 MG disintegrating tablet; Take 1 tablet by mouth 3 times daily as needed for Nausea or Vomiting, Disp-21 tablet, R-0Normal  -     brompheniramine-pseudoephedrine-DM 2-30-10 MG/5ML syrup; Take 5 mLs by mouth 4 times daily as needed for Congestion or Cough, Disp-118 mL, R-0Normal  2. Fever, unspecified  -     POCT Influenza A/B    Flu swab completed in office and positive for flu A at this time.  Discussed with patient potential treatment options including Tamiflu though patient declined this.  I am going to send through some Zofran for symptomatic management of his nausea and vomiting as well as some Bromfed for his cough and congestion.  I did discuss with him this may make him fatigued. Many illnesses are caused by viruses. These conditions usually run their course in 7-14 days.  Antibiotics do not help fight viral infections and are not needed at this time. Viral syndromes are treated with symptomatic support. You may take tylenol or ibuprofen for fever or aches and pains. Stay hydrated by taking sips of water or non caffeinated, noncarbonated, and nonalcoholic beverages throughout the day. For sore throat, you may gargle with warm salt water, use lozenges or sprays. Using a daily antihistamine such as Claritin, Zyrtec or Allegra can help with upper respiratory symptoms. Benadryl can be sedating but is helpful at drying secretions and may be taken at night. Call if you have a fever greater than 102 F or if symptoms do not improve. Your provider may also send you in prescription medications depending on your symptoms and their severity. Take all medications as directed on package

## 2025-02-17 ENCOUNTER — OFFICE VISIT (OUTPATIENT)
Dept: PRIMARY CARE CLINIC | Age: 35
End: 2025-02-17
Payer: COMMERCIAL

## 2025-02-17 VITALS
TEMPERATURE: 97 F | RESPIRATION RATE: 18 BRPM | HEART RATE: 77 BPM | OXYGEN SATURATION: 98 % | DIASTOLIC BLOOD PRESSURE: 82 MMHG | HEIGHT: 77 IN | BODY MASS INDEX: 25.76 KG/M2 | WEIGHT: 218.2 LBS | SYSTOLIC BLOOD PRESSURE: 120 MMHG

## 2025-02-17 DIAGNOSIS — R20.0 BILATERAL HAND NUMBNESS: ICD-10-CM

## 2025-02-17 DIAGNOSIS — J98.4 RESTRICTIVE AIRWAY DISEASE: ICD-10-CM

## 2025-02-17 DIAGNOSIS — R20.0 BILATERAL HAND NUMBNESS: Primary | ICD-10-CM

## 2025-02-17 DIAGNOSIS — R21 RASH: ICD-10-CM

## 2025-02-17 LAB
ALBUMIN SERPL-MCNC: 4.2 G/DL (ref 3.5–5.2)
ALP SERPL-CCNC: 90 U/L (ref 40–129)
ALT SERPL-CCNC: 30 U/L (ref 5–41)
ANION GAP SERPL CALCULATED.3IONS-SCNC: 11 MMOL/L (ref 8–16)
AST SERPL-CCNC: 22 U/L (ref 5–40)
BILIRUB SERPL-MCNC: 0.3 MG/DL (ref 0.2–1.2)
BUN SERPL-MCNC: 17 MG/DL (ref 6–20)
CALCIUM SERPL-MCNC: 9.1 MG/DL (ref 8.6–10)
CHLORIDE SERPL-SCNC: 106 MMOL/L (ref 98–107)
CO2 SERPL-SCNC: 24 MMOL/L (ref 22–29)
CREAT SERPL-MCNC: 0.8 MG/DL (ref 0.7–1.2)
FOLATE SERPL-MCNC: 17.5 NG/ML (ref 4.5–32.2)
GLUCOSE SERPL-MCNC: 92 MG/DL (ref 70–99)
POTASSIUM SERPL-SCNC: 4.1 MMOL/L (ref 3.5–5.1)
PROT SERPL-MCNC: 7.8 G/DL (ref 6.4–8.3)
SODIUM SERPL-SCNC: 141 MMOL/L (ref 136–145)
VIT B12 SERPL-MCNC: 653 PG/ML (ref 232–1245)

## 2025-02-17 PROCEDURE — 99214 OFFICE O/P EST MOD 30 MIN: CPT | Performed by: FAMILY MEDICINE

## 2025-02-17 RX ORDER — ALBUTEROL SULFATE 90 UG/1
2 INHALANT RESPIRATORY (INHALATION) 4 TIMES DAILY PRN
Qty: 18 G | Refills: 0 | Status: SHIPPED | OUTPATIENT
Start: 2025-02-17

## 2025-02-17 RX ORDER — TRIAMCINOLONE ACETONIDE 0.25 MG/G
CREAM TOPICAL
Qty: 80 G | Refills: 0 | Status: SHIPPED | OUTPATIENT
Start: 2025-02-17

## 2025-02-17 ASSESSMENT — ENCOUNTER SYMPTOMS
ABDOMINAL PAIN: 0
WHEEZING: 0
BLOOD IN STOOL: 0
CHEST TIGHTNESS: 0
SHORTNESS OF BREATH: 0
COUGH: 1

## 2025-02-17 NOTE — PROGRESS NOTES
Amilcar Gracia (:  1990) is a 34 y.o. male,Established patient, here for evaluation of the following chief complaint(s):  Rash (Both hands, skin is dry and cracked, no feeling in fingertips, painful, had this for about 10 days) and Cough (Still had lingering cough from flu 2 weeks ago)      Assessment & Plan   ASSESSMENT/PLAN:  1. Bilateral hand numbness  -     Vitamin B12 & Folate; Future  -     Comprehensive Metabolic Panel; Future  -     Niacin (Vitamin B3); Future  2. Restrictive airway disease  -     albuterol sulfate HFA (VENTOLIN HFA) 108 (90 Base) MCG/ACT inhaler; Inhale 2 puffs into the lungs 4 times daily as needed for Wheezing, Disp-18 g, R-0Normal  3. Rash      Mild redness of the hands on the palmar and dorsal aspect.  I am going to prescribe some triamcinolone and encouraged him to apply this before going to bed and wear gloves over top of his hands in order to minimize this being accidentally removed.  I would also encourage him to try and remove any offending agents.  I would like to obtain a B12, folate, and niacin given patient's neurologic symptoms.  If this persists and studies are unrevealing we may need to consider nerve conduction study.  In the meantime I have recommended patient wear nocturnal wrist splints to help minimize symptoms  I am going to refill his albuterol and encouraged him to use this when he has coughing fits or wheezing.  I discussed with patient that this post viral cough can last for several weeks though should dissipate with time    Return if symptoms worsen or fail to improve.         Subjective   SUBJECTIVE/OBJECTIVE:  Amilcar Gracia is a 34 y.o. male who presents due to a couple of concerns.  Patient has noticed some redness and swelling in his hands for the last few days.  He is also with this noted some numbness and tingling in the fingertips.  He has tried multiple over-the-counter creams without much success.  He is unsure of any new exposures however has been

## 2025-02-25 LAB
NIACIN SERPL-MCNC: NORMAL NG/ML
NICOTINAMIDE SERPL-MCNC: 56 NG/ML
NICOTINURATE SERPL-MCNC: NORMAL NG/ML